# Patient Record
Sex: FEMALE | Race: WHITE | NOT HISPANIC OR LATINO | Employment: PART TIME | URBAN - METROPOLITAN AREA
[De-identification: names, ages, dates, MRNs, and addresses within clinical notes are randomized per-mention and may not be internally consistent; named-entity substitution may affect disease eponyms.]

---
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
## 2019-01-21 ENCOUNTER — TELEPHONE (OUTPATIENT)
Dept: FAMILY MEDICINE | Facility: CLINIC | Age: 19
End: 2019-01-21

## 2019-01-21 ENCOUNTER — OFFICE VISIT (OUTPATIENT)
Dept: FAMILY MEDICINE | Facility: CLINIC | Age: 19
End: 2019-01-21
Payer: COMMERCIAL

## 2019-01-21 VITALS
HEIGHT: 66 IN | RESPIRATION RATE: 16 BRPM | WEIGHT: 151.2 LBS | HEART RATE: 108 BPM | BODY MASS INDEX: 24.3 KG/M2 | SYSTOLIC BLOOD PRESSURE: 108 MMHG | DIASTOLIC BLOOD PRESSURE: 70 MMHG | TEMPERATURE: 99 F | OXYGEN SATURATION: 99 %

## 2019-01-21 DIAGNOSIS — K21.9 GASTROESOPHAGEAL REFLUX DISEASE, ESOPHAGITIS PRESENCE NOT SPECIFIED: ICD-10-CM

## 2019-01-21 DIAGNOSIS — N94.6 DYSMENORRHEA: ICD-10-CM

## 2019-01-21 DIAGNOSIS — Z00.00 ROUTINE GENERAL MEDICAL EXAMINATION AT A HEALTH CARE FACILITY: Primary | ICD-10-CM

## 2019-01-21 DIAGNOSIS — L70.0 ACNE VULGARIS: ICD-10-CM

## 2019-01-21 PROCEDURE — 90471 IMMUNIZATION ADMIN: CPT | Performed by: FAMILY MEDICINE

## 2019-01-21 PROCEDURE — 87591 N.GONORRHOEAE DNA AMP PROB: CPT | Performed by: FAMILY MEDICINE

## 2019-01-21 PROCEDURE — 90734 MENACWYD/MENACWYCRM VACC IM: CPT | Performed by: FAMILY MEDICINE

## 2019-01-21 PROCEDURE — 99395 PREV VISIT EST AGE 18-39: CPT | Mod: 25 | Performed by: FAMILY MEDICINE

## 2019-01-21 PROCEDURE — 87491 CHLMYD TRACH DNA AMP PROBE: CPT | Performed by: FAMILY MEDICINE

## 2019-01-21 RX ORDER — CLINDAMYCIN PHOSPHATE 10 UG/ML
LOTION TOPICAL 2 TIMES DAILY
Qty: 60 ML | Refills: 3 | Status: SHIPPED | OUTPATIENT
Start: 2019-01-21 | End: 2020-01-21

## 2019-01-21 RX ORDER — NORGESTIMATE AND ETHINYL ESTRADIOL 0.25-0.035
1 KIT ORAL DAILY
Qty: 84 TABLET | Refills: 3 | Status: SHIPPED | OUTPATIENT
Start: 2019-01-21 | End: 2023-01-09

## 2019-01-21 ASSESSMENT — ENCOUNTER SYMPTOMS
NAUSEA: 0
PALPITATIONS: 0
ARTHRALGIAS: 0
EYE PAIN: 0
FREQUENCY: 0
CHILLS: 0
DIARRHEA: 0
SHORTNESS OF BREATH: 0
NERVOUS/ANXIOUS: 0
JOINT SWELLING: 0
DIZZINESS: 0
BREAST MASS: 0
HEMATURIA: 0
DYSURIA: 0
HEARTBURN: 1
PARESTHESIAS: 0
HEMATOCHEZIA: 0
MYALGIAS: 0
SORE THROAT: 0
ABDOMINAL PAIN: 0
COUGH: 0
WEAKNESS: 0
CONSTIPATION: 0
HEADACHES: 1
FEVER: 0

## 2019-01-21 ASSESSMENT — MIFFLIN-ST. JEOR: SCORE: 1469.65

## 2019-01-21 NOTE — PROGRESS NOTES
"   SUBJECTIVE:   CC: Giselle Lopez is an 18 year old woman who presents for preventive health visit.     Physical   Annual:     Getting at least 3 servings of Calcium per day:  Yes    Bi-annual eye exam:  Yes    Dental care twice a year:  Yes    Sleep apnea or symptoms of sleep apnea:  None    Diet:  Regular (no restrictions)    Frequency of exercise:  1 day/week    Duration of exercise:  Less than 15 minutes    Taking medications regularly:  Yes    Medication side effects:  Not applicable    Additional concerns today:  Yes    PHQ-2 Total Score: 0    GERD/Heartburn  Onset: few months ago     Description:     Burning in chest: no    Intensity: severe, 6-7/10    Progression of Symptoms: worsening and intermittent    Accompanying Signs & Symptoms:  Does it feel like food gets stuck: no  Nausea: no  Vomiting (bloody?): no  Abdominal Pain: YES- sometimes   Black-Tarry stools: no:  Bloody stools: no    History:   Previous ulcers: no    Precipitating factors:   Caffeine use: no  Alcohol use: no  NSAID/Aspirin use: no  Tobacco use: no  Worse with no particular food or drink.    Alleviating factors:  Chewable OTC medication     Therapies Tried and outcome:chewable antacid-does seem to help     She has been having acid reflux recently and had norovirus last week. Since then many different foods have been triggering acid reflux. She has been trying to identify triggers for the heartburn but has not been successful. Notes that at one point pretzels were triggering symptoms so she refrained from eating this but now pretzels don't trigger symptoms. Denies excessive ibuprofen use. Notes that symptoms include an \"acid feeling going up her throat and it hurts\". This occurs when she is both sitting and laying down and may also occur randomly. She has tried taking OTC antacids which provides some relief. She had no hx of acid reflux prior to a few months ago. She has not had heartburn since she had her wisdom teeth removed 1/17. "     Acne  She stopped taking the OCP the beginning of the year because she is not sexually active but her acne worsened so she resumed taking it again but her acne has not cleared. She has been taking it for 3 months now. She has been applying cetaphil face wash and lotion and then uses foundation powder and mascara. Nothing is different. She has tried using acne treatment but it did not seem to work and it dried her skin. She does not change her pillow case once a week.     Weight    Wt Readings from Last 3 Encounters:   01/21/19 68.6 kg (151 lb 3.2 oz) (81 %)*   11/24/17 70.3 kg (155 lb) (86 %)*   11/04/16 59.7 kg (131 lb 9.6 oz) (65 %)*     * Growth percentiles are based on [facility] (Girls, 2-20 Years) data.     Patient has lost 4 lbs since LOV. Notes that she is not living on campus due to remodeling so she has been cooking at home and not eating dining sinclair food.     Other problems to add on...  -Noted that HR is elevated today, 108 bpm. She denies any recent decongestant use and she does not regularly drink caffeine or energy drinks.  Notes that she has been taking pain medications due to having her wisdom teeth removed 1.17. The procedure went well and she is healing well.       Today's PHQ-2 Score:   PHQ-2 ( 1999 Pfizer) 1/21/2019   Q1: Little interest or pleasure in doing things 0   Q2: Feeling down, depressed or hopeless 0   PHQ-2 Score 0   Q1: Little interest or pleasure in doing things Not at all   Q2: Feeling down, depressed or hopeless Not at all   PHQ-2 Score 0     Abuse: Current or Past(Physical, Sexual or Emotional)- NO  Do you feel safe in your environment? YES    Social History     Tobacco Use     Smoking status: Never Smoker     Smokeless tobacco: Never Used   Substance Use Topics     Alcohol use: No     Alcohol Use 1/21/2019   If you drink alcohol do you typically have greater than 3 drinks per day OR greater than 7 drinks per week? No   No flowsheet data found.    Reviewed orders with patient.   Reviewed health maintenance and updated orders accordingly - Yes  Labs reviewed in EPIC  BP Readings from Last 3 Encounters:   01/21/19 108/70 (26 %/ 67 %)*   11/24/17 119/79 (76 %/ 92 %)*   11/04/16 100/60 (11 %/ 22 %)*     *BP percentiles are based on the August 2017 AAP Clinical Practice Guideline for girls    Wt Readings from Last 3 Encounters:   01/21/19 68.6 kg (151 lb 3.2 oz) (81 %)*   11/24/17 70.3 kg (155 lb) (86 %)*   11/04/16 59.7 kg (131 lb 9.6 oz) (65 %)*     * Growth percentiles are based on CDC (Girls, 2-20 Years) data.          Recent Labs   Lab Test 08/11/11  0935   ALT 16   CR 0.61   GFRESTIMATED GFR not calculated, patient <16 years old.   GFRESTBLACK GFR not calculated, patient <16 years old.   POTASSIUM 4.2   TSH 2.79      Mammogram not appropriate for this patient based on age.    Pertinent mammograms are reviewed under the imaging tab.    History of abnormal Pap smear: NO - under age 21, PAP not appropriate for age     Reviewed and updated as needed this visit by clinical staff  Tobacco  Allergies  Meds  Problems  Med Hx  Surg Hx  Soc Hx        Reviewed and updated as needed this visit by Provider          Review of Systems   Constitutional: Negative for chills and fever.   HENT: Negative for congestion, ear pain, hearing loss and sore throat.    Eyes: Positive for visual disturbance. Negative for pain.   Respiratory: Negative for cough and shortness of breath.    Cardiovascular: Negative for chest pain, palpitations and peripheral edema.   Gastrointestinal: Positive for heartburn. Negative for abdominal pain, constipation, diarrhea, hematochezia and nausea.   Breasts:  Negative for tenderness, breast mass and discharge.   Genitourinary: Negative for dysuria, frequency, genital sores, hematuria, pelvic pain, urgency, vaginal bleeding and vaginal discharge.   Musculoskeletal: Negative for arthralgias, joint swelling and myalgias.   Skin: Negative for rash.   Neurological: Positive for  "headaches. Negative for dizziness, weakness and paresthesias.   Psychiatric/Behavioral: Negative for mood changes. The patient is not nervous/anxious.      This document serves as a record of the services and decisions personally performed and made by Cortney Tong MD. It was created on her behalf by Kylee Phan, a trained medical scribe. The creation of this document is based on the provider's statements to the medical scribe.  Kylee Phan January 21, 2019 9:52 AM      OBJECTIVE:   /70 (BP Location: Right arm, Patient Position: Sitting, Cuff Size: Adult Regular)   Pulse 108   Temp 99  F (37.2  C) (Oral)   Resp 16   Ht 1.664 m (5' 5.5\")   Wt 68.6 kg (151 lb 3.2 oz)   LMP 01/06/2019   SpO2 99%   BMI 24.78 kg/m       Physical Exam  GENERAL: healthy, alert and no distress  EYES: Eyes grossly normal to inspection, PERRL and conjunctivae and sclerae normal  HENT: ear canals and TM's normal, nose and mouth without ulcers or lesions  NECK: no adenopathy, no asymmetry, masses, or scars and thyroid normal to palpation  RESP: lungs clear to auscultation - no rales, rhonchi or wheezes  BREAST: normal without masses, tenderness or nipple discharge and no palpable axillary masses or adenopathy  CV: regular rate and rhythm, normal S1 S2, no S3 or S4, no murmur, click or rub, no peripheral edema and peripheral pulses strong  ABDOMEN: soft, nontender, no hepatosplenomegaly, no masses and bowel sounds normal  MS: no gross musculoskeletal defects noted, no edema  SKIN: no suspicious lesions or rashes  NEURO: Normal strength and tone, mentation intact and speech normal  PSYCH: mentation appears normal, affect normal/bright    Diagnostic Test Results:  No results found for this or any previous visit (from the past 24 hour(s)).    ASSESSMENT/PLAN:   (Z00.00) Routine general medical examination at a health care facility  (primary encounter diagnosis)  Comment: Normal physical exam.   Plan: NEISSERIA GONORRHOEA PCR, " "CHLAMYDIA TRACHOMATIS        PCR          (N94.6) Dysmenorrhea  Comment: Periods regular. Refilled rx  Plan: norgestimate-ethinyl estradiol         (ORTHO-CYCLEN/SPRINTEC) 0.25-35 MG-MCG tablet          (K21.9) Gastroesophageal reflux disease, esophagitis presence not specified  Comment: Screening for H. Pylori. Recommended starting Zantac 150 mg twice daily. Refrain from eating 2 hours before bed. Discussed that fatty, acidic, and spicy foods will trigger symptoms, avoid these.   Plan: ranitidine (ZANTAC) 150 MG tablet, H Pylori         antigen stool          (L70.0) Acne vulgaris  Comment: Recommended clindamycin and washing pillow case once a week.   Plan: clindamycin (CLEOCIN T) 1 % external lotion          Follow up in 1 year      COUNSELING:  Reviewed preventive health counseling, as reflected in patient instructions       Regular exercise       Healthy diet/nutrition    BP Readings from Last 1 Encounters:   01/21/19 108/70 (26 %/ 67 %)*     *BP percentiles are based on the August 2017 AAP Clinical Practice Guideline for girls     Estimated body mass index is 24.78 kg/m  as calculated from the following:    Height as of this encounter: 1.664 m (5' 5.5\").    Weight as of this encounter: 68.6 kg (151 lb 3.2 oz).   reports that  has never smoked. she has never used smokeless tobacco.    Counseling Resources:  ATP IV Guidelines  Pooled Cohorts Equation Calculator  Breast Cancer Risk Calculator  FRAX Risk Assessment  ICSI Preventive Guidelines  Dietary Guidelines for Americans, 2010  USDA's MyPlate  ASA Prophylaxis  Lung CA Screening    The information in this document, created by the medical scribe for me, accurately reflects the services I personally performed and the decisions made by me. I have reviewed and approved this document for accuracy prior to leaving the patient care area.  January 21, 2019 10:05 AM    Cortney Tong MD  [facility]  "

## 2019-01-21 NOTE — TELEPHONE ENCOUNTER
Disp Refills Start End FAROOQ   ranitidine (ZANTAC) 150 MG tablet 60 tablet 3 1/21/2019 1/21/2020 --   Sig - Route: Take 1 tablet (150 mg) by mouth 2 times daily - Oral   Sent to pharmacy as: ranitidine (ZANTAC) 150 MG tablet   Class: E-Prescribe     Order corrected to cover 90 day supply, no refills    Catherine Guido RN, BS  Clinical Nurse Triage.

## 2019-01-21 NOTE — NURSING NOTE
"Chief Complaint   Patient presents with     Physical     Refill Request     Heartburn     Derm Problem     Initial /70 (BP Location: Right arm, Patient Position: Sitting, Cuff Size: Adult Regular)   Pulse 108   Temp 99  F (37.2  C) (Oral)   Resp 16   Ht 1.664 m (5' 5.5\")   Wt 68.6 kg (151 lb 3.2 oz)   LMP 01/06/2019   SpO2 99%   BMI 24.78 kg/m   Estimated body mass index is 24.78 kg/m  as calculated from the following:    Height as of this encounter: 1.664 m (5' 5.5\").    Weight as of this encounter: 68.6 kg (151 lb 3.2 oz).  BP completed using cuff size regular RIGHT arm    Lisa Magill, CMA    "

## 2019-01-21 NOTE — NURSING NOTE
Screening Questionnaire for Adult Immunization    Are you sick today?   No   Do you have allergies to medications, food, a vaccine component or latex?   No   Have you ever had a serious reaction after receiving a vaccination?   No   Do you have a long-term health problem with heart disease, lung disease, asthma, kidney disease, metabolic disease (e.g. diabetes), anemia, or other blood disorder?   No   Do you have cancer, leukemia, HIV/AIDS, or any other immune system problem?   No   In the past 3 months, have you taken medications that affect  your immune system, such as prednisone, other steroids, or anticancer drugs; drugs for the treatment of rheumatoid arthritis, Crohn s disease, or psoriasis; or have you had radiation treatments?   No   Have you had a seizure, or a brain or other nervous system problem?   No   During the past year, have you received a transfusion of blood or blood     products, or been given immune (gamma) globulin or antiviral drug?   No   For women: Are you pregnant or is there a chance you could become        pregnant during the next month?   No   Have you received any vaccinations in the past 4 weeks?   No     Immunization questionnaire answers were all negative.        Per orders of Dr. Tong, injection of Menactra given by Patricia Richardson. Patient instructed to remain in clinic for 15 minutes afterwards, and to report any adverse reaction to me immediately.       Screening performed by Patricia Richardson on 1/21/2019 at 10:18 AM.

## 2019-01-22 DIAGNOSIS — K21.9 GASTROESOPHAGEAL REFLUX DISEASE, ESOPHAGITIS PRESENCE NOT SPECIFIED: ICD-10-CM

## 2019-01-22 PROCEDURE — 87338 HPYLORI STOOL AG IA: CPT | Performed by: FAMILY MEDICINE

## 2019-01-24 LAB
H PYLORI AG STL QL IA: NORMAL
SPECIMEN SOURCE: NORMAL

## 2019-11-07 ENCOUNTER — HEALTH MAINTENANCE LETTER (OUTPATIENT)
Age: 19
End: 2019-11-07

## 2020-11-29 ENCOUNTER — HEALTH MAINTENANCE LETTER (OUTPATIENT)
Age: 20
End: 2020-11-29

## 2021-09-25 ENCOUNTER — HEALTH MAINTENANCE LETTER (OUTPATIENT)
Age: 21
End: 2021-09-25

## 2022-01-15 ENCOUNTER — HEALTH MAINTENANCE LETTER (OUTPATIENT)
Age: 22
End: 2022-01-15

## 2022-07-13 ENCOUNTER — TRANSFERRED RECORDS (OUTPATIENT)
Dept: MULTI SPECIALTY CLINIC | Facility: CLINIC | Age: 22
End: 2022-07-13

## 2022-07-13 LAB — PAP-ABSTRACT: NORMAL

## 2022-12-26 ENCOUNTER — HEALTH MAINTENANCE LETTER (OUTPATIENT)
Age: 22
End: 2022-12-26

## 2023-01-09 ENCOUNTER — PRENATAL OFFICE VISIT (OUTPATIENT)
Dept: NURSING | Facility: CLINIC | Age: 23
End: 2023-01-09

## 2023-01-09 VITALS — BODY MASS INDEX: 25.16 KG/M2 | HEIGHT: 65 IN

## 2023-01-09 DIAGNOSIS — Z34.90 ENCOUNTER FOR SUPERVISION OF NORMAL PREGNANCY: Primary | ICD-10-CM

## 2023-01-09 DIAGNOSIS — Z23 NEED FOR TDAP VACCINATION: ICD-10-CM

## 2023-01-09 PROBLEM — G43.109 MIGRAINE WITH AURA: Status: ACTIVE | Noted: 2019-01-31

## 2023-01-09 PROBLEM — M54.31 SCIATICA OF RIGHT SIDE: Status: ACTIVE | Noted: 2021-01-21

## 2023-01-09 PROBLEM — H34.239 BRANCH RETINAL ARTERY OCCLUSION: Status: ACTIVE | Noted: 2019-01-31

## 2023-01-09 LAB
ABO/RH(D): NORMAL
ANTIBODY SCREEN: NEGATIVE
SPECIMEN EXPIRATION DATE: NORMAL

## 2023-01-09 PROCEDURE — 99207 PR NO CHARGE NURSE ONLY: CPT

## 2023-01-09 RX ORDER — SUMATRIPTAN 25 MG/1
25 TABLET, FILM COATED ORAL
Status: ON HOLD | COMMUNITY
Start: 2021-01-21 | End: 2023-08-04

## 2023-01-09 RX ORDER — OMEPRAZOLE 20 MG/1
20 TABLET, DELAYED RELEASE ORAL DAILY
Status: ON HOLD | COMMUNITY
End: 2023-08-04

## 2023-01-09 NOTE — PROGRESS NOTES
Important Information for Provider:     New ob nurse intake by phone, second pregnancy. Handouts reviewed. Recommended B6, Unisom for nausea . Patient is still breastfeeding, delivered 2022  in [place]. Discussed genetic screening. NOB 1/10/2023 with CNM. Ultrasound scheduled for 2023( NOB scheduled before nurse intake , no ultrasound ordered) CNM to call with results    Caffeine intake/servings daily - 1  Calcium intake/servings daily - 3  Exercise 5 times weekly - describe ; walks, precautions given  Sunscreen used - Yes  Seatbelts used - Yes  Guns stored in the home - No  Self Breast Exam - Yes  Pap test up to date -  Yes  Dental exam up to date -  Yes  Immunizations reviewed and up to date - Yes  Abuse: Current or Past (Physical, Sexual or Emotional) - No  Do you feel safe in your environment - Yes  Do you cope well with stress - Yes      Prenatal OB Questionnaire  Patient supplied answers from flow sheet for:  Prenatal OB Questionnaire.  Past Medical History  Have you ever recieved care for your mental health? : No  Have you ever been in a major accident or suffered serious trauma?: No  Within the last year, has anyone hit, slapped, kicked or otherwise hurt you?: No  In the last year, has anyone forced you to have sex when you didn't want to?: No    Past Medical History 2   Have you ever received a blood transfusion?: No  Would you accept a blood transfusion if was medically recommended?: Yes  Does anyone in your home smoke?: No   Is your blood type Rh negative?: Unknown  Have you ever ?: (!) Yes  Have you been hospitalized for a nonsurgical reason excluding normal delivery?: No  Have you ever had an abnormal pap smear?: No    Past Medical History (Continued)  Do you have a history of abnormalities of the uterus?: No  Did your mother take BUTCH or any other hormones when she was pregnant with you?: No  Do you have any other problems we have not asked about which you feel may be important  to this pregnancy?: No                     Allergies as of 1/9/2023:    Allergies as of 01/09/2023 - Reviewed 01/21/2019   Allergen Reaction Noted     Amoxicillin Hives 08/24/2005       Current medications are:  Current Outpatient Medications   Medication Sig Dispense Refill     Prenatal Vit-Ebony-Fe Fum-FA (VINATE M) 27-1 MG TABS Take 1 tablet by mouth daily       omeprazole (PRILOSEC OTC) 20 MG EC tablet Take 20 mg by mouth daily (Patient not taking: Reported on 1/9/2023)       SUMAtriptan (IMITREX) 25 MG tablet Take 25 mg by mouth (Patient not taking: Reported on 1/9/2023)           Early ultrasound screening tool:    Does patient have irregular periods?  No  Did patient use hormonal birth control in the three months prior to positive urine pregnancy test? No  Is the patient breastfeeding?  No  Is the patient 10 weeks or greater at time of education visit?  Yes

## 2023-01-10 ENCOUNTER — PRENATAL OFFICE VISIT (OUTPATIENT)
Dept: MIDWIFE SERVICES | Facility: CLINIC | Age: 23
End: 2023-01-10
Payer: COMMERCIAL

## 2023-01-10 VITALS
TEMPERATURE: 98 F | HEIGHT: 65 IN | HEART RATE: 94 BPM | WEIGHT: 149.3 LBS | DIASTOLIC BLOOD PRESSURE: 69 MMHG | BODY MASS INDEX: 24.87 KG/M2 | SYSTOLIC BLOOD PRESSURE: 112 MMHG

## 2023-01-10 DIAGNOSIS — Z34.80 SUPERVISION OF OTHER NORMAL PREGNANCY, ANTEPARTUM: ICD-10-CM

## 2023-01-10 LAB
ALBUMIN UR-MCNC: NEGATIVE MG/DL
APPEARANCE UR: CLEAR
BILIRUB UR QL STRIP: NEGATIVE
COLOR UR AUTO: YELLOW
ERYTHROCYTE [DISTWIDTH] IN BLOOD BY AUTOMATED COUNT: 14.3 % (ref 10–15)
GLUCOSE UR STRIP-MCNC: NEGATIVE MG/DL
HCT VFR BLD AUTO: 37.2 % (ref 35–47)
HGB BLD-MCNC: 12.5 G/DL (ref 11.7–15.7)
HGB UR QL STRIP: NEGATIVE
KETONES UR STRIP-MCNC: NEGATIVE MG/DL
LEUKOCYTE ESTERASE UR QL STRIP: NEGATIVE
MCH RBC QN AUTO: 26.8 PG (ref 26.5–33)
MCHC RBC AUTO-ENTMCNC: 33.6 G/DL (ref 31.5–36.5)
MCV RBC AUTO: 80 FL (ref 78–100)
NITRATE UR QL: NEGATIVE
PH UR STRIP: 6 [PH] (ref 5–7)
PLATELET # BLD AUTO: 257 10E3/UL (ref 150–450)
RBC # BLD AUTO: 4.67 10E6/UL (ref 3.8–5.2)
SP GR UR STRIP: 1.01 (ref 1–1.03)
UROBILINOGEN UR STRIP-ACNC: 0.2 E.U./DL
WBC # BLD AUTO: 7 10E3/UL (ref 4–11)

## 2023-01-10 PROCEDURE — 86780 TREPONEMA PALLIDUM: CPT | Performed by: ADVANCED PRACTICE MIDWIFE

## 2023-01-10 PROCEDURE — 85027 COMPLETE CBC AUTOMATED: CPT | Performed by: ADVANCED PRACTICE MIDWIFE

## 2023-01-10 PROCEDURE — 87389 HIV-1 AG W/HIV-1&-2 AB AG IA: CPT | Performed by: ADVANCED PRACTICE MIDWIFE

## 2023-01-10 PROCEDURE — 87086 URINE CULTURE/COLONY COUNT: CPT | Performed by: ADVANCED PRACTICE MIDWIFE

## 2023-01-10 PROCEDURE — 81003 URINALYSIS AUTO W/O SCOPE: CPT | Performed by: ADVANCED PRACTICE MIDWIFE

## 2023-01-10 PROCEDURE — 86901 BLOOD TYPING SEROLOGIC RH(D): CPT | Performed by: ADVANCED PRACTICE MIDWIFE

## 2023-01-10 PROCEDURE — 86900 BLOOD TYPING SEROLOGIC ABO: CPT | Performed by: ADVANCED PRACTICE MIDWIFE

## 2023-01-10 PROCEDURE — 86803 HEPATITIS C AB TEST: CPT | Performed by: ADVANCED PRACTICE MIDWIFE

## 2023-01-10 PROCEDURE — 86850 RBC ANTIBODY SCREEN: CPT | Performed by: ADVANCED PRACTICE MIDWIFE

## 2023-01-10 PROCEDURE — 36415 COLL VENOUS BLD VENIPUNCTURE: CPT | Performed by: ADVANCED PRACTICE MIDWIFE

## 2023-01-10 PROCEDURE — 99207 PR FIRST OB VISIT: CPT | Performed by: ADVANCED PRACTICE MIDWIFE

## 2023-01-10 PROCEDURE — 87340 HEPATITIS B SURFACE AG IA: CPT | Performed by: ADVANCED PRACTICE MIDWIFE

## 2023-01-10 PROCEDURE — 86762 RUBELLA ANTIBODY: CPT | Performed by: ADVANCED PRACTICE MIDWIFE

## 2023-01-10 ASSESSMENT — PATIENT HEALTH QUESTIONNAIRE - PHQ9: SUM OF ALL RESPONSES TO PHQ QUESTIONS 1-9: 0

## 2023-01-10 NOTE — PATIENT INSTRUCTIONS
Deajabari Desai     Congratulations on your pregnancy!! We are so pleased you have chosen us to partner with you for your prenatal care, thank you.    You are welcome to make your appointments with any one of us.  If you would like to try and meet us all, please see the list.  If it works out better for your schedule, or you prefer, you could make your appointments with just a few of us.  You can also see us at the [facility] on Wednesdays.    Here is a list of the Certified Nurse Midwives in our group;   - Jameel Gray,   - Cb Barrow,   - Deanne Goodwin,   - Becki Linda,   - Chela Tatum,   - Zulma Horne,  - Laura Goldsmith.  We work as a team and share call at the hospital so it may be any one of us seven with you when you labor and birth.    If you have questions or concerns during your pregnancy please call us at [phone] and press option 1.  This routes you directly to our OB/Gyn clinic staff and nurses during business hours.  After hours your call will be routed to a central answering service and nurse line.    [email or web address] messages are great for communicating about non urgent matters.  If you are in labor, please call.  We would much rather connect with you then have you be at home and worried.    We wish you the very best, and we are excited to be part of your journey.    HOA Combs [facility]       Remedies for nausea and vomiting with pregnancy:    1. Eat small frequent meals every 2-3 hours if possible.     2. Avoid food at extremes of temparture and drinks without carbonation.    3. Eat foods that appeal to you, avoiding fats and spicy foods.    4. Bread, pasta, crackers, potatoes, and rice tend to be tolerated the best.    5. Don't worry as much about what you eat in the first 3 months, it is more important that you can eat and keep it down.     6. Try ginger ale.    7. Ginger is a herbal remedy for nausea and you can use it in any form.  There are ginger tablets you can  purchase.  The dose is 250 mg 4 times a day.     8. You may also try Unisom 25 mg at night which is a sleeping medication along with Vitamin B6 25- 50 mg morning and night.  This combination takes up to a week to work so give it some time.     9. Go for walk, get some fresh air    If you begin to vomit more than 5 or 6 times a day and feel that you are unable to keep anything down, call the clinic for an appointment to be seen.  ([phone])    I hope you feel better.    HOA Combs CNM

## 2023-01-10 NOTE — PROGRESS NOTES
12w6d   NOB intake, had last baby in [place], no concerns.  Delivered 2022.  Pt is still breastfeeding and was having irregular menstrual cycles.  LMP of 10/12/22, within 2-3 days of either side of that date.  Declines genetic testing.  Has dating U/S scheduled on 23 with CNM to review.  RTC 4 wks HOA Combs CNM     Giselle Edwards is a 22 year old female    Pt presents to clinic today for a NOB visit.  Patient's last menstrual period was 10/12/2022.. Estimated Date of Delivery: 2023 is calculated from lmp alone, waiting on U/S for confirmation     She has not had bleeding since her LMP.   She has had mild nausea. Weight loss has not occurred.   This was a planned pregnancy.   FOB is involved,   [place]      OTHER CONCERNS: Pt states none  1. Pt has a report of hx of migraine with visual symptoms.  In 2019 had a blurry spot in her vision that has continued until today.  Has improved somewhat.  Last migraine 2019, none with last pregnancy.  Reports that there were no changes to management during her previous pregnancy.      Pt's hx of HSV is negative    ============================================  PERSONAL/SOCIAL HISTORY  Lives lives with their family.  Employment: Part time.  Her job involves moderate activity . ( couple hours several mornings a week)  Exercises minimal exercise  Pt denies abuse and feels safe in her home and relationships.     REVIEW OF SYSTEMS  C: NEGATIVE for fever, chills, change in weight  I: NEGATIVE for worrisome rashes, moles or lesions  E/M: NEGATIVE for ear, mouth and throat problems  R: NEGATIVE for significant cough or SOB  CV: NEGATIVE for chest pain, palpitations or peripheral edema  GI: NEGATIVE for nausea, abdominal pain, heartburn, or change in bowel habits  : NEGATIVE for frequency, dysuria, or hematuria  PSYCHIATRIC:  NEGATIVE for depression, anxiety or other mental health concerns    PHYSICAL EXAM:  /69   Pulse 94   " Temp 98  F (36.7  C) (Oral)   Ht 1.651 m (5' 5\")   Wt 67.7 kg (149 lb 4.8 oz)   LMP 10/12/2022   BMI 24.84 kg/m    BMI- Body mass index is 24.84 kg/m ., RECOMMENDED WEIGHT GAIN: 25-35 lbs.  GENERAL:  Pleasant pregnant female, alert, cooperative and well groomed.  SKIN:  Warm and dry, without lesions or rashes  HEAD: Symmetrical features.  MOUTH:  Buccal mucosa pink, moist without lesions.  Teeth in good repair.    NECK:  Thyroid without enlargement and nodules.  Lymph nodes not palpable.   LUNGS:  Clear to auscultation.      HEART:  RRR without murmur.  ABDOMEN: Soft without masses , tenderness or organomegaly.  No CVA tenderness.  Uterus not palpable at size equal to dates.  No scars noted..  MUSCULOSKELETAL:  Full range of motion  EXTREMITIES:  No edema. No significant varicosities.     PELVIC EXAM:  Deferred        GC/CHLAMYDIA CULTURE OBTAINED:PATIENT DECLINED    ASSESSMENT:  Intrauterine pregnancy at 12w6d weeks gestation.     (Z34.90) Encounter for supervision of normal pregnancy  Comment:   Plan:      PLAN:  Oriented to CNM service.    Instructed on use of triage nurse line and contacting the on call CNM after hours for an urgent need such as fever, vagina bleeding, bladder or vaginal infection, rupture of membranes,  or term labor.      Discussed the indications, uses for and false positives for quad screen  and fetal survey ultrasound at 18-20 weeks gestation. Will inform us at the next visit if she wished to avail herself of these screens.    Instructed on best evidence for: weight gain for her weight and height for pregnancy; healthy diet; exercise and activity during pregnancy; and maintenance of a generally healthy lifestyle.     Discussed the harms, benefits, side effects and alternative therapies for current prescribed and OTC medications.    Laura CHAVES    "

## 2023-01-11 LAB
HBV SURFACE AG SERPL QL IA: NONREACTIVE
HCV AB SERPL QL IA: NONREACTIVE
HIV 1+2 AB+HIV1 P24 AG SERPL QL IA: NONREACTIVE
RUBV IGG SERPL QL IA: 6.77 INDEX
RUBV IGG SERPL QL IA: POSITIVE
T PALLIDUM AB SER QL: NONREACTIVE

## 2023-01-12 ENCOUNTER — VIRTUAL VISIT (OUTPATIENT)
Dept: MIDWIFE SERVICES | Facility: CLINIC | Age: 23
End: 2023-01-12
Payer: COMMERCIAL

## 2023-01-12 ENCOUNTER — ANCILLARY PROCEDURE (OUTPATIENT)
Dept: ULTRASOUND IMAGING | Facility: CLINIC | Age: 23
End: 2023-01-12
Attending: ADVANCED PRACTICE MIDWIFE
Payer: COMMERCIAL

## 2023-01-12 DIAGNOSIS — Z34.81 ENCOUNTER FOR SUPERVISION OF OTHER NORMAL PREGNANCY, FIRST TRIMESTER: Primary | ICD-10-CM

## 2023-01-12 DIAGNOSIS — Z34.90 ENCOUNTER FOR SUPERVISION OF NORMAL PREGNANCY: ICD-10-CM

## 2023-01-12 LAB — BACTERIA UR CULT: NORMAL

## 2023-01-12 PROCEDURE — 76801 OB US < 14 WKS SINGLE FETUS: CPT | Mod: TC | Performed by: RADIOLOGY

## 2023-01-12 PROCEDURE — 99207 PR NO BILLABLE SERVICE THIS VISIT: CPT | Performed by: ADVANCED PRACTICE MIDWIFE

## 2023-01-12 NOTE — PROGRESS NOTES
Telephone-Visit Details    Type of service:  telephone Visit    Telephone Start Time ): 1543    Telephone End Time : 1550    Originating Location (pt. Location): Home    Distant Location (provider location):  On-site    Mode of Communication:  Telephone visit      S: Telephone call to patient to review ultrasound results from today. Feels well and reports no other symptoms other than fatigue.     O: ultrasound results reviewed were preliminary and showed a viable IUP at 10w6d with an EDC of 8/4/23. THis was discordant with her dates by LPM but she has closely spaced pregnancies and has been having 45-60 day cycles since she started menstruating again. She is not surprised by this adjusment to her due date.     A    (Z34.81) Encounter for supervision of other normal pregnancy, first trimester  (primary encounter diagnosis)  Comment: reviewed early ultrasound results today and adjusted due date. Otherwise no questions or concerns  Plan: Has next prenatal visit scheduled on 2/8 at [facility].    Deanne Goodwin, CHASTITY, APRJONES HAYWOOD

## 2023-02-08 ENCOUNTER — PRENATAL OFFICE VISIT (OUTPATIENT)
Dept: MIDWIFE SERVICES | Facility: CLINIC | Age: 23
End: 2023-02-08
Payer: COMMERCIAL

## 2023-02-08 VITALS
HEART RATE: 91 BPM | DIASTOLIC BLOOD PRESSURE: 65 MMHG | SYSTOLIC BLOOD PRESSURE: 109 MMHG | WEIGHT: 149 LBS | TEMPERATURE: 98.5 F | BODY MASS INDEX: 24.79 KG/M2

## 2023-02-08 DIAGNOSIS — Z34.82 ENCOUNTER FOR SUPERVISION OF OTHER NORMAL PREGNANCY IN SECOND TRIMESTER: Primary | ICD-10-CM

## 2023-02-08 PROCEDURE — 99207 PR PRENATAL VISIT: CPT | Performed by: ADVANCED PRACTICE MIDWIFE

## 2023-02-08 NOTE — PROGRESS NOTES
14w5d   Patient is feeling well. Denies any vaginal bleeding, water leaking, abdominal pain, or other concerns. No fetal movements yet.   Declined genetic testing. No questions or concerns.   Danger signs discussed. Patient knows when to call triage and has numbers to call.   Follow up in 5 weeks for fetal survey.   Zulma Horne CNM

## 2023-03-10 ENCOUNTER — ANCILLARY PROCEDURE (OUTPATIENT)
Dept: ULTRASOUND IMAGING | Facility: CLINIC | Age: 23
End: 2023-03-10
Attending: ADVANCED PRACTICE MIDWIFE
Payer: COMMERCIAL

## 2023-03-10 ENCOUNTER — PRENATAL OFFICE VISIT (OUTPATIENT)
Dept: MIDWIFE SERVICES | Facility: CLINIC | Age: 23
End: 2023-03-10
Attending: ADVANCED PRACTICE MIDWIFE
Payer: COMMERCIAL

## 2023-03-10 VITALS
BODY MASS INDEX: 25.63 KG/M2 | DIASTOLIC BLOOD PRESSURE: 62 MMHG | SYSTOLIC BLOOD PRESSURE: 100 MMHG | HEART RATE: 82 BPM | OXYGEN SATURATION: 99 % | WEIGHT: 154 LBS

## 2023-03-10 DIAGNOSIS — Z34.82 ENCOUNTER FOR SUPERVISION OF OTHER NORMAL PREGNANCY IN SECOND TRIMESTER: Primary | ICD-10-CM

## 2023-03-10 DIAGNOSIS — Z34.82 ENCOUNTER FOR SUPERVISION OF OTHER NORMAL PREGNANCY IN SECOND TRIMESTER: ICD-10-CM

## 2023-03-10 PROCEDURE — 99207 PR PRENATAL VISIT: CPT | Performed by: ADVANCED PRACTICE MIDWIFE

## 2023-03-10 PROCEDURE — 76805 OB US >/= 14 WKS SNGL FETUS: CPT | Performed by: OBSTETRICS & GYNECOLOGY

## 2023-03-10 NOTE — PROGRESS NOTES
19w0d  Patient is feeling well. Denies any vaginal bleeding, water leaking, abdominal pain, or other concerns. Starting to feel baby moving.   Here for follow up anatomy scan. Unable to see posterior fossa well due to fetal position. Repeat ultrasound ordered. Otherwise WNL. No genetic testing. Doing well. No concerns.   Danger signs discussed. Patient knows when to call triage and has numbers to call.   Follow up after repeat anatomy scan.    Zulma Horne CNM

## 2023-03-24 ENCOUNTER — PRENATAL OFFICE VISIT (OUTPATIENT)
Dept: MIDWIFE SERVICES | Facility: CLINIC | Age: 23
End: 2023-03-24
Attending: ADVANCED PRACTICE MIDWIFE
Payer: COMMERCIAL

## 2023-03-24 ENCOUNTER — ANCILLARY PROCEDURE (OUTPATIENT)
Dept: ULTRASOUND IMAGING | Facility: CLINIC | Age: 23
End: 2023-03-24
Attending: ADVANCED PRACTICE MIDWIFE
Payer: COMMERCIAL

## 2023-03-24 VITALS
DIASTOLIC BLOOD PRESSURE: 65 MMHG | SYSTOLIC BLOOD PRESSURE: 99 MMHG | TEMPERATURE: 98.7 F | BODY MASS INDEX: 26.38 KG/M2 | WEIGHT: 158.5 LBS | HEART RATE: 87 BPM

## 2023-03-24 DIAGNOSIS — Z34.82 ENCOUNTER FOR SUPERVISION OF OTHER NORMAL PREGNANCY IN SECOND TRIMESTER: ICD-10-CM

## 2023-03-24 DIAGNOSIS — Z34.82 ENCOUNTER FOR SUPERVISION OF OTHER NORMAL PREGNANCY IN SECOND TRIMESTER: Primary | ICD-10-CM

## 2023-03-24 PROCEDURE — 76816 OB US FOLLOW-UP PER FETUS: CPT | Performed by: OBSTETRICS & GYNECOLOGY

## 2023-03-24 PROCEDURE — 99207 PR PRENATAL VISIT: CPT | Performed by: ADVANCED PRACTICE MIDWIFE

## 2023-03-24 NOTE — PROGRESS NOTES
21w0d  Giselle is here after follow up anatomy ultrasound. All views visualized today. Reports good fetal movement. Denies leaking of fluid, vaginal bleeding, regular uterine contractions, headache, visual changes, or other concerns. Normal questions about prenatal care, hospital labor and delivery unit, etc. Discussed upcoming GCT in 3 weeks. Discussed COVID-19 booster. She declines today but after reviewing risks of COVID in pregnancy she is interested in getting booster at future appointment. RTC in 3 weeks for GCT, Hgb.    HOA Crawley CNM

## 2023-04-13 ENCOUNTER — TELEPHONE (OUTPATIENT)
Dept: NURSING | Facility: CLINIC | Age: 23
End: 2023-04-13
Payer: COMMERCIAL

## 2023-04-13 NOTE — TELEPHONE ENCOUNTER
Pt called stating that she has had some pain (pinching pulling) on her right side with nausea and lightheadedness.  Pt denies LOF of bleeding and confirms that baby is moving.  Pt advised to lay on left side and to drink 2 or more cups of water and reassess in 1 hour.  Pt advised to call if any bleeding. LOF or pain increases or is not relieved.  Pt verbalized understanding and agreed to the plan.

## 2023-04-16 ENCOUNTER — HEALTH MAINTENANCE LETTER (OUTPATIENT)
Age: 23
End: 2023-04-16

## 2023-04-21 ENCOUNTER — PRENATAL OFFICE VISIT (OUTPATIENT)
Dept: MIDWIFE SERVICES | Facility: CLINIC | Age: 23
End: 2023-04-21
Payer: COMMERCIAL

## 2023-04-21 VITALS
DIASTOLIC BLOOD PRESSURE: 64 MMHG | WEIGHT: 158 LBS | HEART RATE: 86 BPM | OXYGEN SATURATION: 100 % | BODY MASS INDEX: 26.29 KG/M2 | SYSTOLIC BLOOD PRESSURE: 105 MMHG

## 2023-04-21 DIAGNOSIS — Z34.82 ENCOUNTER FOR SUPERVISION OF OTHER NORMAL PREGNANCY IN SECOND TRIMESTER: Primary | ICD-10-CM

## 2023-04-21 PROCEDURE — 99207 PR PRENATAL VISIT: CPT | Performed by: ADVANCED PRACTICE MIDWIFE

## 2023-04-21 NOTE — PROGRESS NOTES
25w0d  Patient is feeling well. Positive fetal movement. Denies water leaking, vaginal bleeding, decreased fetal movement, contraction pain, or headaches.   Doing well and feeling good. Just getting over a stomach bug this past week. Was able to eat and stay hydrated. Went to parents for help caring for herself and daughter since her  was out of town. Their catalytic converter was stolen, they are down to 1 car until June. Not ready for GCT today. Will plan to do lab only visit in the next week or two. Having sciatic pain, down left leg. Had this previously so has PT exercises from before. Discussed maternity belt around 32 weeks as well. Now it is not too bothersome. No other questions or concerns today.   Danger signs reviewed, pre-eclampsia signs and symptoms discussed.   Knows when to call triage and has phone numbers.    Follow up in 4 weeks. Tdap vaccination next visit.   HOA Hall CNM

## 2023-05-08 ENCOUNTER — LAB (OUTPATIENT)
Dept: LAB | Facility: CLINIC | Age: 23
End: 2023-05-08
Payer: COMMERCIAL

## 2023-05-08 DIAGNOSIS — Z34.82 ENCOUNTER FOR SUPERVISION OF OTHER NORMAL PREGNANCY IN SECOND TRIMESTER: ICD-10-CM

## 2023-05-08 LAB
GLUCOSE 1H P 50 G GLC PO SERPL-MCNC: 110 MG/DL (ref 70–129)
HGB BLD-MCNC: 11.2 G/DL (ref 11.7–15.7)

## 2023-05-08 PROCEDURE — 36415 COLL VENOUS BLD VENIPUNCTURE: CPT

## 2023-05-08 PROCEDURE — 82950 GLUCOSE TEST: CPT

## 2023-05-17 ENCOUNTER — PRENATAL OFFICE VISIT (OUTPATIENT)
Dept: MIDWIFE SERVICES | Facility: CLINIC | Age: 23
End: 2023-05-17
Payer: COMMERCIAL

## 2023-05-17 VITALS
TEMPERATURE: 98.2 F | DIASTOLIC BLOOD PRESSURE: 70 MMHG | SYSTOLIC BLOOD PRESSURE: 120 MMHG | BODY MASS INDEX: 27.62 KG/M2 | WEIGHT: 166 LBS | HEART RATE: 109 BPM

## 2023-05-17 DIAGNOSIS — Z34.83 ENCOUNTER FOR SUPERVISION OF OTHER NORMAL PREGNANCY, THIRD TRIMESTER: Primary | ICD-10-CM

## 2023-05-17 PROCEDURE — 99207 PR PRENATAL VISIT: CPT | Performed by: ADVANCED PRACTICE MIDWIFE

## 2023-05-17 NOTE — PROGRESS NOTES
28w5d  Here at [facility]. Feeling well, no concerns. Baby is active. No regular contractions, LOF or bleeding. Works as a  and has to work today so would like to wait on TDAP and COVID booster. Will consider doing both at next visit. Discussed allergy testing, thinks she may have had PCN in the past without a reaction. Will check with her mom and let us know at next appointment. Has phone numbers. RTC in 4 weeks. MML

## 2023-06-14 ENCOUNTER — PRENATAL OFFICE VISIT (OUTPATIENT)
Dept: MIDWIFE SERVICES | Facility: CLINIC | Age: 23
End: 2023-06-14
Payer: COMMERCIAL

## 2023-06-14 VITALS
TEMPERATURE: 98 F | BODY MASS INDEX: 29.12 KG/M2 | SYSTOLIC BLOOD PRESSURE: 127 MMHG | WEIGHT: 175 LBS | HEART RATE: 92 BPM | DIASTOLIC BLOOD PRESSURE: 70 MMHG

## 2023-06-14 DIAGNOSIS — Z23 NEED FOR TDAP VACCINATION: ICD-10-CM

## 2023-06-14 DIAGNOSIS — Z34.83 ENCOUNTER FOR SUPERVISION OF OTHER NORMAL PREGNANCY IN THIRD TRIMESTER: Primary | ICD-10-CM

## 2023-06-14 DIAGNOSIS — Z88.0 ALLERGY TO AMOXICILLIN: ICD-10-CM

## 2023-06-14 PROCEDURE — 90471 IMMUNIZATION ADMIN: CPT | Performed by: ADVANCED PRACTICE MIDWIFE

## 2023-06-14 PROCEDURE — 99207 PR PRENATAL VISIT: CPT | Performed by: ADVANCED PRACTICE MIDWIFE

## 2023-06-14 PROCEDURE — 90715 TDAP VACCINE 7 YRS/> IM: CPT | Performed by: ADVANCED PRACTICE MIDWIFE

## 2023-06-14 NOTE — PROGRESS NOTES
32w5d  Patient is feeling well. Positive fetal movement. Denies water leaking, vaginal bleeding, decreased fetal movement, contraction pain, or headaches.   Doing well overall. Feeling more pressure from the baby sitting lower. Discussed a maternity support belt, she thinks she has one at home so prefers to try that before getting one from us. Has bad insurance, waiting to get on medical assistance, should be soon. She is currently on her dad's insurance. Will not be using his for delivery so not going to meet his deductible. She will let us know next visit if she cannot find one at home or it does not fit well. She also is craving ice, knows that sometimes means low iron. Her hemoglobin was recently checked at [facility] and was over 11, she cannot remember the number. Discussed sometimes iron can be low even with normal hemoglobin so can check that. She could also start eating more iron rich foods or supplement with iron tablets. Discussed next HGB check is at 36 weeks. We could check today as well if she wants and add in iron testing. She prefers to wait at this time. Will eat iron rich foods and consider testing iron with her HBG check at 36 weeks. She has an allergy to PCN, referral sent for allergy testing. May consider postponing until her medical insurance kicks in. Tdap and covid booster today. No other questions or concerns today.   Danger signs reviewed, pre-eclampsia signs and symptoms discussed.   Knows when to call triage and has phone numbers.   Follow up in 2 weeks.   Zulma Horne, HOA HAYWOOD

## 2023-06-26 ENCOUNTER — PRENATAL OFFICE VISIT (OUTPATIENT)
Dept: MIDWIFE SERVICES | Facility: CLINIC | Age: 23
End: 2023-06-26
Payer: COMMERCIAL

## 2023-06-26 VITALS
HEART RATE: 90 BPM | DIASTOLIC BLOOD PRESSURE: 66 MMHG | WEIGHT: 177.4 LBS | SYSTOLIC BLOOD PRESSURE: 97 MMHG | TEMPERATURE: 98 F | BODY MASS INDEX: 29.52 KG/M2

## 2023-06-26 DIAGNOSIS — Z34.83 ENCOUNTER FOR SUPERVISION OF OTHER NORMAL PREGNANCY IN THIRD TRIMESTER: Primary | ICD-10-CM

## 2023-06-26 PROCEDURE — 99207 PR PRENATAL VISIT: CPT | Performed by: ADVANCED PRACTICE MIDWIFE

## 2023-06-26 NOTE — PROGRESS NOTES
34w3d  Patient is feeling well. Positive fetal movement. Denies water leaking, vaginal bleeding, decreased fetal movement, contraction pain, or headaches.   Doing well. Her and her family are going to [place] for a week, very relaxing vacation with no agenda, leaving today. She has been feeling the same discomforts of pregnancy. Did not find her maternity belt, has not had a chance to look. Will let us know if she wants one from us. She has not gotten her new insurance yet so does not want it from us until that goes through. Also waiting on the insurance for an allergy appointment. No concerns today.   Danger signs reviewed, pre-eclampsia signs and symptoms discussed.   Knows when to call triage and has phone numbers.   Follow up in 2 weeks. GBS, HGB, and BSUS next visit.   HOA Hall CNM

## 2023-07-02 ENCOUNTER — NURSE TRIAGE (OUTPATIENT)
Dept: NURSING | Facility: CLINIC | Age: 23
End: 2023-07-02
Payer: COMMERCIAL

## 2023-07-03 NOTE — TELEPHONE ENCOUNTER
"[facility]. Pregnant 35w2d  Vomiting all day= 6-7 times. It began @ 8am. Whole family was vomiting last night and today. She is unable to keep even sips of water or Gatorade down. She last urinated 1/2 hr ago. Still feeling the baby move, no change noted with pregnancy..   OBGYN: Midwives     Triaged to a disposition of Go to ED now or PCP triage.  Chela Tatum CNM midwife on call, paged via am com @ 7:47pm. Patient should go to ED now for evaluation and possible IV fluids, etc.   7:52pm contacted patient with this information and she agrees with this plan.     Ifeoma Ernst RN Triage Nurse Advisor 7:52 PM 7/2/2023  Reason for Disposition    [1] SEVERE vomiting (e.g., 6 or more times/day) AND [2] present > 8 hours (Exception: patient sounds well, is drinking liquids, does not sound dehydrated, and vomiting has lasted less than 24 hours)    Additional Information    Negative: Shock suspected (e.g., cold/pale/clammy skin, too weak to stand, low BP, rapid pulse)    Negative: Difficult to awaken or acting confused (e.g., disoriented, slurred speech)    Negative: Sounds like a life-threatening emergency to the triager    Negative: Vomiting occurs only while coughing    Negative: [1] Pregnant < 20 Weeks AND [2] nausea/vomiting began in early pregnancy (i.e., 4-8 weeks pregnant)    Negative: Chest pain    Negative: Headache is main symptom    Negative: Vomiting (or Nausea) in a cancer patient who is currently (or recently) receiving chemotherapy or radiation therapy, or cancer patient who has metastatic or end-stage cancer and is receiving palliative care    Negative: [1] Vomiting AND [2] contains red blood or black (\"coffee ground\") material  (Exception: few red streaks in vomit that only happened once)    Negative: Severe pain in one eye    Negative: Recent head injury (within last 3 days)    Negative: Recent abdominal injury (within last 3 days)    Negative: [1] Insulin-dependent diabetes " (Type I) AND [2] glucose > 400 mg/dl (22 mmol/l)    Negative: [1] Vomiting AND [2] hernia is more painful or swollen than usual    Protocols used: VOMITING-A-AH

## 2023-07-12 ENCOUNTER — PRENATAL OFFICE VISIT (OUTPATIENT)
Dept: MIDWIFE SERVICES | Facility: CLINIC | Age: 23
End: 2023-07-12
Payer: COMMERCIAL

## 2023-07-12 VITALS
BODY MASS INDEX: 30.02 KG/M2 | SYSTOLIC BLOOD PRESSURE: 109 MMHG | WEIGHT: 180.4 LBS | HEART RATE: 80 BPM | DIASTOLIC BLOOD PRESSURE: 76 MMHG | OXYGEN SATURATION: 100 %

## 2023-07-12 DIAGNOSIS — Z34.83 ENCOUNTER FOR SUPERVISION OF OTHER NORMAL PREGNANCY IN THIRD TRIMESTER: Primary | ICD-10-CM

## 2023-07-12 LAB
HGB BLD-MCNC: 11.2 G/DL (ref 11.7–15.7)
HOLD SPECIMEN: NORMAL

## 2023-07-12 PROCEDURE — 87186 SC STD MICRODIL/AGAR DIL: CPT | Performed by: ADVANCED PRACTICE MIDWIFE

## 2023-07-12 PROCEDURE — 99207 PR PRENATAL VISIT: CPT | Performed by: ADVANCED PRACTICE MIDWIFE

## 2023-07-12 PROCEDURE — 36415 COLL VENOUS BLD VENIPUNCTURE: CPT | Performed by: ADVANCED PRACTICE MIDWIFE

## 2023-07-12 PROCEDURE — 87653 STREP B DNA AMP PROBE: CPT | Performed by: ADVANCED PRACTICE MIDWIFE

## 2023-07-12 PROCEDURE — 87077 CULTURE AEROBIC IDENTIFY: CPT | Performed by: ADVANCED PRACTICE MIDWIFE

## 2023-07-12 NOTE — PROGRESS NOTES
36w5d  Patient is feeling well. Positive fetal movement. Denies water leaking, vaginal bleeding, decreased fetal movement, contraction pain, or headaches.   Doing well. Had a nice vacation. Her daughter got sick and was vomiting all night the last night of their vacation and then her and her  got it. She had to go to the ER for IV fluids. BSUS shows cephalic position. GBS and HGB today.   Danger signs reviewed, pre-eclampsia signs and symptoms discussed.   Knows when to call triage and has phone numbers.   Follow up in 1 week.  HOA Hall CNM

## 2023-07-13 LAB — GP B STREP DNA SPEC QL NAA+PROBE: POSITIVE

## 2023-07-16 LAB — BACTERIA SPEC CULT: ABNORMAL

## 2023-07-18 ENCOUNTER — PRENATAL OFFICE VISIT (OUTPATIENT)
Dept: MIDWIFE SERVICES | Facility: CLINIC | Age: 23
End: 2023-07-18
Payer: COMMERCIAL

## 2023-07-18 VITALS
HEART RATE: 83 BPM | DIASTOLIC BLOOD PRESSURE: 65 MMHG | BODY MASS INDEX: 30.54 KG/M2 | SYSTOLIC BLOOD PRESSURE: 108 MMHG | WEIGHT: 183.5 LBS | OXYGEN SATURATION: 98 %

## 2023-07-18 DIAGNOSIS — Z34.83 ENCOUNTER FOR SUPERVISION OF OTHER NORMAL PREGNANCY IN THIRD TRIMESTER: Primary | ICD-10-CM

## 2023-07-18 PROCEDURE — 99207 PR PRENATAL VISIT: CPT | Performed by: ADVANCED PRACTICE MIDWIFE

## 2023-07-18 NOTE — PROGRESS NOTES
37w4d  Giselle is feeling well. Feeling some low back pain and Colstrip Mercado contractions and is just exhausted in general. Denies painful contractions. Encouraged rest and hydration. Reviewed s/sx of labor and when to call. Reports good fetal movement. Denies leaking of fluid, vaginal bleeding, regular uterine contractions, headache, visual changes, or other concerns. She has not done allergy testing in pregnancy and has a childhood rash reaction to amoxicillin. Plan treatment of GBS bacteria with vancomycin in labor. Discussed this together and plan for 48h observation of  by pediatric provider in hospital. RTC weekly.    HOA Crawley CNM

## 2023-07-26 ENCOUNTER — PRENATAL OFFICE VISIT (OUTPATIENT)
Dept: MIDWIFE SERVICES | Facility: CLINIC | Age: 23
End: 2023-07-26
Payer: COMMERCIAL

## 2023-07-26 VITALS
WEIGHT: 185 LBS | DIASTOLIC BLOOD PRESSURE: 60 MMHG | BODY MASS INDEX: 30.79 KG/M2 | HEART RATE: 90 BPM | SYSTOLIC BLOOD PRESSURE: 110 MMHG | TEMPERATURE: 97.1 F

## 2023-07-26 DIAGNOSIS — Z34.83 ENCOUNTER FOR SUPERVISION OF OTHER NORMAL PREGNANCY IN THIRD TRIMESTER: Primary | ICD-10-CM

## 2023-07-26 PROCEDURE — 99207 PR PRENATAL VISIT: CPT | Performed by: ADVANCED PRACTICE MIDWIFE

## 2023-07-26 NOTE — PROGRESS NOTES
38w5d   Patient is feeling well. Positive fetal movement. Denies water leaking, vaginal bleeding, decreased fetal movement, contraction pain, or headaches.   Doing well, just overall sore and tired with normal end of pregnancy discomforts. Her last baby came at 42 weeks. She has a wedding she needs to be at 2 weeks after her due date so needs to be done before then. Has not picked a date she would be induced if she has not delivered by then. Will think about it over the next week and let us know at her next appointment. Will plan postdates testing based on that. She got her new insurance. Decided to not do the allergy testing.   No other questions or concerns at this time.   Danger signs reviewed, pre-eclampsia signs and symptoms discussed.   Knows when to call triage and has phone numbers.   Follow up in 1 week.   HOA Hall CNM

## 2023-07-28 NOTE — PATIENT INSTRUCTIONS
Week 38 of Your Pregnancy: Care Instructions  Believe it or not, your baby is almost here. You may notice how your baby responds to sounds, warmth, cold, and light. You may even know what kind of music your baby likes.    Even if you expect a vaginal birth, it's a good idea to learn about  section ().  is the delivery of a baby through a cut (incision) in your belly. It's a major surgery, so it has more risks than a vaginal birth.   During the first 2 weeks after the birth, limit visitors. Don't allow visitors who have colds or infections. Ask visitors to wash their hands before they touch your baby. And never let anyone smoke around your baby.     Know about unplanned C-sections.  Reasons for an unplanned  include labor that slows or stops, signs of distress in your baby, and high blood pressure or other problems for you.     Know about planned C-sections.  If your baby isn't in a head-down position for delivery (breech position), your doctor may plan a . Or you may have a planned  if you're having twins or more.     Get as much help as you can while you're in the hospital.  You can learn about feeding, diapering, and bathing your baby.     Talk to your doctor or midwife about how to care for the umbilical cord stump.  If your baby will be circumcised, also ask about how to care for that.     Ask friends or family for help, as you need it.  If you can, have another adult in your home for at least 2 or 3 days after the birth.     Try to nap when your baby naps.  This may be your best chance to get some sleep.     Watch for changes in your mental health.  For the first 1 to 2 weeks after birth, it's common to cry or feel sad or irritable. If these feelings last for more than 2 weeks, you may have postpartum depression. Ask your doctor for help. Postpartum depression can be treated.   Follow-up care is a key part of your treatment and safety. Be sure to make and go  "to all appointments, and call your doctor if you are having problems. It's also a good idea to know your test results and keep a list of the medicines you take.  Where can you learn more?  Go to https://www.Venuemob.net/patiented  Enter B044 in the search box to learn more about \"Week 38 of Your Pregnancy: Care Instructions.\"  Current as of: 2022               Content Version: 13.7    8725-0428 [employer].   Care instructions adapted under license by your healthcare professional. If you have questions about a medical condition or this instruction, always ask your healthcare professional. [employer] disclaims any warranty or liability for your use of this information.      Week 39 of Your Pregnancy: Care Instructions    [place] babies can look different than what you see in pictures or movies. Their heads can be a strange shape right after birth. And they may have swollen eyes and red marks on their faces.   You can still get pregnant even if you are breastfeeding. If you don't want to get pregnant, talk to your doctor about birth control.   Tips for week 39 of pregnancy  If you plan to breastfeed, get prepared.     Continue to eat healthy foods.  Keep taking your prenatal vitamins during breastfeeding if your doctor recommends it.  Talk to your doctor before taking any medicines or supplements.  Choose the right birth control for you.     Intrauterine devices (IUDs) are placed in the uterus. Sometimes the IUD can be placed right after giving birth. They work for years.  Hormonal implants are placed under the skin of the arm. They also work for years.  Depo-Provera is a shot. You get it every 3 months.  Birth control pills can be used. They're taken every day.  Tubal ligation (tying your tubes) and vasectomy are surgeries. They're permanent.  Diaphragms, spermicide, and condoms must be used each time you have sex. If you used a diaphragm before, you should get refitted after " "the baby is born.  A birth control patch or ring can be used. These just can't be started until several weeks after you give birth.  Follow-up care is a key part of your treatment and safety. Be sure to make and go to all appointments, and call your doctor if you are having problems. It's also a good idea to know your test results and keep a list of the medicines you take.  Where can you learn more?  Go to https://www.Revolymer.net/patiented  Enter A811 in the search box to learn more about \"Week 39 of Your Pregnancy: Care Instructions.\"  Current as of: 2022               Content Version: 13.7    0667-9153 [employer].   Care instructions adapted under license by your healthcare professional. If you have questions about a medical condition or this instruction, always ask your healthcare professional. [employer] disclaims any warranty or liability for your use of this information.      Weeks 40 to 41 of Your Pregnancy: Care Instructions  By week 40, you've reached your due date. Your baby could be coming any day. Most babies born after their due dates are healthy. But you may have tests to make sure everything is okay. If you feel stressed, you could try a meditation exercise, such as guided imagery. It may help you relax.    If you are past 41 weeks, your doctor may measure the amount of fluid that surrounds your baby. They may also test your baby's movement and heart rate.   If you don't start labor on your own by 41 or 42 weeks, your doctor may want to start (induce) labor. If there are other concerns, your doctor may talk to you about a .   To start (induce) your labor, your doctor may do any of these things.    Place a narrow tube with a small balloon on the end (balloon catheter) into your cervix.  The doctor inflates the balloon. This helps your cervix open (dilate).     Sweep the membranes (separate the lining of the amniotic sac from the uterus).  This helps " "the uterus make a chemical that can start contractions.     \"Break your water\" (rupture the amniotic sac).  This may be done if your cervix has started to open.     Use medicines.  They may be used to soften the cervix or start contractions.   How to do guided imagery    Close your eyes, and take a few deep breaths. Picture a peaceful setting, such as a beach or a meadow. Add a winding path. Follow the path until you feel more and more relaxed.   Take a few minutes to breathe slowly and feel the calm. When you are ready, slowly take yourself back to the present. Count to 3, and open your eyes.   Follow-up care is a key part of your treatment and safety. Be sure to make and go to all appointments, and call your doctor if you are having problems. It's also a good idea to know your test results and keep a list of the medicines you take.  Where can you learn more?  Go to https://www.Buyt.In.Wildfang/patiented  Enter T922 in the search box to learn more about \"Weeks 40 to 41 of Your Pregnancy: Care Instructions.\"  Current as of: November 9, 2022               Content Version: 13.7    8632-5927 [employer].   Care instructions adapted under license by your healthcare professional. If you have questions about a medical condition or this instruction, always ask your healthcare professional. [employer] disclaims any warranty or liability for your use of this information.      Labor Induction  What You Need to Know  What is labor induction?  In most cases, it is best to go into labor naturally. When labor does not start on its own, we may use medicine or other methods to start (induce) labor. This is called induction, which:  Helps the uterus contract  Thins, softens and opens the cervix (opening of the uterus)  When is induction used?  There are two types of induction.  Medical induction may be done to protect the health of the parent or baby if:  There are medical concerns for you or your baby.  You " "haven't had your baby by 41 weeks.  Induction for non-medical reasons may be done at 39 weeks or later if:  You live far from the hospital.  You've been having contractions for many days.  You've given birth quickly in the past.   We will not perform an induction for non-medical reasons before 39 weeks. Studies show that babies born before 39 weeks may struggle with breathing, feeding, sleeping and staying warm. They are more likely to have health problems and may need to stay in the hospital longer. If we start your labor for medical reasons, the benefits will outweigh these risks. We will talk to you about your risks, benefits and alternatives (other options) before we start your labor.  How is induction done?  We may start your labor by doing one or more of the following:  We may need to help your cervix soften and open (sometimes called \"ripening\") to prepare it for labor. There are two ways to do this:  Medicines--these may be in the form of pills that you swallow or medicines that are put into the vagina next to the cervix.  Mechanical--using either a single or double balloon. These are small balloons that are attached to tubes that go up inside the cervix. The balloons are then filled with water to put pressure on the cervix and help the cervix soften and open. Depending on the type of balloon used, you may be allowed to go home after it is placed.  After your cervix is ready:  We may give you medicine through an IV (a small tube placed in your vein). This medicine is called Pitocin. It helps the muscles of your uterus to contract.  We may make a small opening in your bag of water (the sac around your baby). This is called an amniotomy. Sometimes called \"breaking the water\". It may help your uterus contract and your cervix open.  What might happen if my labor is induced?  Some of this depends on how your labor is started and how your body responds. Your labor may be more complicated. You and your baby may " need more medical treatments. In general:  You may not go into labor right away. If so, we may send you home with follow-up instructions.  It will be important to monitor you and your baby during the induction.  You may not be able to move around during labor. You will have two belts with monitors attached to your belly. These allow the nurse to watch your contractions and your baby's heart rate.  Your labor may take longer than if you went into labor on your own. It might take more than one day.  If you need cervical ripening, it is important to know that it may be many hours (even days) until delivery happens.  Cervical ripening can be slow and require several doses before your body is ready to labor.  A long labor may increase the risk of infection in mother and baby.  Your labor may not progress as planned. This could lead to a  birth.  Can I plan the date of my delivery?  After 39 weeks, you may ask about planning your delivery date. This is only an option if your body--and your baby--are ready. To find out, we will check your cervix and your baby's heart rate.   If you are ready to be induced, we will give you a date and time to come to the hospital. If many patients are in labor that day, we may need to start your labor at another time.  If you are not ready, we will not start your labor. It will be safer for your baby to come on its own.  What else do I need to know?  Before you have an induction, make sure you understand the reasons, risks and benefits. Ask your doctor or nurse-midwife:  Why do I need to be induced?  What are the risks to my baby?  How will you start my labor?  How will you know if my baby is ready to be born?  How will you know if my body is ready to give birth?  Where can I get more information?  To learn more about induction, you may visit these websites:  The American College of Nurse-Midwives:  www.mymidwife.org  The American College of Obstetricians and Gynecologists:  "www.acog.org  Childbirth Connection:  www.childbirthconnection.org   Dimes: www.marchofdimes.com  Association of Women's Health, Obstetrics, and  Nursing  www.scplvn6hqt.org/go-the-full-40&#047;  For informational purposes only. Not to replace the advice of your health care provider. Copyright   2008 [facility]. All rights reserved. Clinically reviewed by the  System Operations Leadership team. [employer] [id number] - REV .      Labor Induction  What You Need to Know  What is labor induction?  In most cases, it is best to go into labor naturally. When labor does not start on its own, we may use medicine or other methods to start (induce) labor. This is called induction, which:  Helps the uterus contract  Thins, softens and opens the cervix (opening of the uterus)  When is induction used?  There are two types of induction.  Medical induction may be done to protect the health of the parent or baby if:  There are medical concerns for you or your baby.  You haven't had your baby by 41 weeks.  Induction for non-medical reasons may be done at 39 weeks or later if:  You live far from the hospital.  You've been having contractions for many days.  You've given birth quickly in the past. We will not perform an induction for non-medical reasons before 39 weeks. Studies show that babies born before 39 weeks may struggle with breathing, feeding, sleeping and staying warm. They are more likely to have health problems and may need to stay in the hospital longer. If we start your labor for medical reasons, the benefits will outweigh these risks. We will talk to you about your risks, benefits and alternatives (other options) before we start your labor.  How is induction done?  We may start your labor by doing one or more of the following:  We may need to help your cervix soften and open (sometimes called \"ripening\") to prepare it for labor. There are two ways to do this:  Medicines--these may " "be in the form of pills that you swallow or medicines that are put into the vagina next to the cervix.  Mechanical--using either a single or double balloon. These are small balloons that are attached to tubes that go up inside the cervix. The balloons are then filled with water to put pressure on the cervix and help the cervix soften and open. Depending on the type of balloon used, you may be allowed to go home after it is placed.  After your cervix is ready:  We may give you medicine through an IV (a small tube placed in your vein). This medicine is called Pitocin. It helps the muscles of your uterus to contract.  We may make a small opening in your bag of water (the sac around your baby). This is called an amniotomy. Sometimes called \"breaking the water\". It may help your uterus contract and your cervix open.  What might happen if my labor is induced?  Some of this depends on how your labor is started and how your body responds. Your labor may be more complicated. You and your baby may need more medical treatments. In general:  You may not go into labor right away. If so, we may send you home with follow-up instructions.  It will be important to monitor you and your baby during the induction.  You may not be able to move around during labor. You will have two belts with monitors attached to your belly. These allow the nurse to watch your contractions and your baby's heart rate.  Your labor may take longer than if you went into labor on your own. It might take more than one day.  If you need cervical ripening, it is important to know that it may be many hours (even days) until delivery happens.  Cervical ripening can be slow and require several doses before your body is ready to labor.  A long labor may increase the risk of infection in mother and baby.  Your labor may not progress as planned. This could lead to a  birth.  Can I plan the date of my delivery?  After 39 weeks, you may ask about planning your " delivery date. This is only an option if your body--and your baby--are ready. To find out, we will check your cervix and your baby's heart rate.   If you are ready to be induced, we will give you a date and time to come to the hospital. If many patients are in labor that day, we may need to start your labor at another time.  If you are not ready, we will not start your labor. It will be safer for your baby to come on its own.  What else do I need to know?  Before you have an induction, make sure you understand the reasons, risks and benefits. Ask your doctor or nurse-midwife:  Why do I need to be induced?  What are the risks to my baby?  How will you start my labor?  How will you know if my baby is ready to be born?  How will you know if my body is ready to give birth?  Where can I get more information?  To learn more about induction, you may visit these websites:  The American College of Nurse-Midwives:  www.mymidwife.org  The American College of Obstetricians and Gynecologists: www.acog.org  Childbirth Connection:  www.childbirthconnection.org  March of Dimes: www.marchofdimes.com  Association of Women's Health, Obstetrics, and  Nursing  www.fjgxnb8hms.org/go-the-full-40&#047;  For informational purposes only. Not to replace the advice of your health care provider. Copyright   2008 [employer]. All rights reserved. Clinically reviewed by the  System Operations Leadership team. [employer] [id number] - REV .

## 2023-08-01 ENCOUNTER — PRENATAL OFFICE VISIT (OUTPATIENT)
Dept: MIDWIFE SERVICES | Facility: CLINIC | Age: 23
End: 2023-08-01
Payer: COMMERCIAL

## 2023-08-01 VITALS
BODY MASS INDEX: 31.2 KG/M2 | WEIGHT: 187.5 LBS | HEART RATE: 105 BPM | SYSTOLIC BLOOD PRESSURE: 123 MMHG | DIASTOLIC BLOOD PRESSURE: 73 MMHG | OXYGEN SATURATION: 97 %

## 2023-08-01 DIAGNOSIS — Z34.83 ENCOUNTER FOR SUPERVISION OF OTHER NORMAL PREGNANCY IN THIRD TRIMESTER: Primary | ICD-10-CM

## 2023-08-01 PROCEDURE — 99207 PR PRENATAL VISIT: CPT | Performed by: ADVANCED PRACTICE MIDWIFE

## 2023-08-01 NOTE — PROGRESS NOTES
S: Feels well,  Baby active.  Denies  vaginal bleeding or leaking of fluid. No headache, increase in edema, no epigastric pain.     Would like to discuss IOL today. States she is in a wedding on August 18. She is hoping to avoid induction and go into labor on her own, but would consider IOL at 41wks if she has not had her baby  yet. Requesting cervical exam. Discussed risks/benefits of membrane sweep with GBS+ status.     O: Vitals: /73   Pulse 105   Wt 85 kg (187 lb 8 oz)   LMP 10/12/2022   SpO2 97%   BMI 31.20 kg/m    BMI= Body mass index is 31.2 kg/m .  Exam:  Constitutional: healthy, alert and no distress  Respiratory: respirations even and unlabored  Gastrointestinal: Abdomen soft, non-tender. Fundus measures appropriate for gestational age. Fetal heart tones hear without difficulty and within normal limits  : Normal external genitalia without lesions  Cervix 2cm / 75% / -2, mid position, soft, membranes swept with pt consent   Psychiatric: mentation appears normal and affect normal/bright  A:     ICD-10-CM    1. Encounter for supervision of other normal pregnancy in third trimester  Z34.83         P: Labor signs and symptoms discussed, aware of numbers to call.  Discussed warning signs of PIH/preeclampsia and patient will monitor.  GBS screen completed. Discussed plans for labor.   Discussed postdates options - follow up 8/7, can schedule postdates testing or IOL at that point   Encouraged patient to call with any questions or concerns.  Return to clinic 1 weeks    HOA Osman, CHASTITY

## 2023-08-03 ENCOUNTER — HOSPITAL ENCOUNTER (INPATIENT)
Facility: CLINIC | Age: 23
LOS: 2 days | Discharge: HOME HEALTH | End: 2023-08-05
Attending: ADVANCED PRACTICE MIDWIFE | Admitting: ADVANCED PRACTICE MIDWIFE
Payer: COMMERCIAL

## 2023-08-03 ENCOUNTER — TELEPHONE (OUTPATIENT)
Dept: MIDWIFE SERVICES | Facility: CLINIC | Age: 23
End: 2023-08-03
Payer: COMMERCIAL

## 2023-08-03 ENCOUNTER — NURSE TRIAGE (OUTPATIENT)
Dept: NURSING | Facility: CLINIC | Age: 23
End: 2023-08-03
Payer: COMMERCIAL

## 2023-08-03 PROBLEM — Z37.9 NORMAL LABOR: Status: ACTIVE | Noted: 2023-08-03

## 2023-08-03 LAB
ABO/RH(D): NORMAL
ANTIBODY SCREEN: NEGATIVE
ERYTHROCYTE [DISTWIDTH] IN BLOOD BY AUTOMATED COUNT: 14.3 % (ref 10–15)
HCT VFR BLD AUTO: 35 % (ref 35–47)
HGB BLD-MCNC: 11.2 G/DL (ref 11.7–15.7)
MCH RBC QN AUTO: 23.1 PG (ref 26.5–33)
MCHC RBC AUTO-ENTMCNC: 32 G/DL (ref 31.5–36.5)
MCV RBC AUTO: 72 FL (ref 78–100)
PLATELET # BLD AUTO: 193 10E3/UL (ref 150–450)
RBC # BLD AUTO: 4.85 10E6/UL (ref 3.8–5.2)
SPECIMEN EXPIRATION DATE: NORMAL
T PALLIDUM AB SER QL: NONREACTIVE
WBC # BLD AUTO: 11.6 10E3/UL (ref 4–11)

## 2023-08-03 PROCEDURE — 250N000009 HC RX 250

## 2023-08-03 PROCEDURE — 59400 OBSTETRICAL CARE: CPT | Performed by: ADVANCED PRACTICE MIDWIFE

## 2023-08-03 PROCEDURE — 10907ZC DRAINAGE OF AMNIOTIC FLUID, THERAPEUTIC FROM PRODUCTS OF CONCEPTION, VIA NATURAL OR ARTIFICIAL OPENING: ICD-10-PCS | Performed by: ADVANCED PRACTICE MIDWIFE

## 2023-08-03 PROCEDURE — 86780 TREPONEMA PALLIDUM: CPT | Performed by: ADVANCED PRACTICE MIDWIFE

## 2023-08-03 PROCEDURE — 120N000002 HC R&B MED SURG/OB UMMC

## 2023-08-03 PROCEDURE — 86901 BLOOD TYPING SEROLOGIC RH(D): CPT | Performed by: ADVANCED PRACTICE MIDWIFE

## 2023-08-03 PROCEDURE — 250N000013 HC RX MED GY IP 250 OP 250 PS 637: Performed by: ADVANCED PRACTICE MIDWIFE

## 2023-08-03 PROCEDURE — 722N000001 HC LABOR CARE VAGINAL DELIVERY SINGLE

## 2023-08-03 PROCEDURE — 258N000003 HC RX IP 258 OP 636: Performed by: ADVANCED PRACTICE MIDWIFE

## 2023-08-03 PROCEDURE — 86850 RBC ANTIBODY SCREEN: CPT | Performed by: ADVANCED PRACTICE MIDWIFE

## 2023-08-03 PROCEDURE — 85027 COMPLETE CBC AUTOMATED: CPT | Performed by: ADVANCED PRACTICE MIDWIFE

## 2023-08-03 PROCEDURE — 258N000003 HC RX IP 258 OP 636

## 2023-08-03 PROCEDURE — 85041 AUTOMATED RBC COUNT: CPT | Performed by: ADVANCED PRACTICE MIDWIFE

## 2023-08-03 PROCEDURE — 250N000011 HC RX IP 250 OP 636: Performed by: ADVANCED PRACTICE MIDWIFE

## 2023-08-03 RX ORDER — NALOXONE HYDROCHLORIDE 0.4 MG/ML
0.4 INJECTION, SOLUTION INTRAMUSCULAR; INTRAVENOUS; SUBCUTANEOUS
Status: DISCONTINUED | OUTPATIENT
Start: 2023-08-03 | End: 2023-08-03

## 2023-08-03 RX ORDER — CARBOPROST TROMETHAMINE 250 UG/ML
250 INJECTION, SOLUTION INTRAMUSCULAR
Status: DISCONTINUED | OUTPATIENT
Start: 2023-08-03 | End: 2023-08-05 | Stop reason: HOSPADM

## 2023-08-03 RX ORDER — PROCHLORPERAZINE 25 MG
25 SUPPOSITORY, RECTAL RECTAL EVERY 12 HOURS PRN
Status: DISCONTINUED | OUTPATIENT
Start: 2023-08-03 | End: 2023-08-03

## 2023-08-03 RX ORDER — TRANEXAMIC ACID 10 MG/ML
1 INJECTION, SOLUTION INTRAVENOUS EVERY 30 MIN PRN
Status: DISCONTINUED | OUTPATIENT
Start: 2023-08-03 | End: 2023-08-03

## 2023-08-03 RX ORDER — ONDANSETRON 4 MG/1
4 TABLET, ORALLY DISINTEGRATING ORAL EVERY 6 HOURS PRN
Status: DISCONTINUED | OUTPATIENT
Start: 2023-08-03 | End: 2023-08-03

## 2023-08-03 RX ORDER — KETOROLAC TROMETHAMINE 30 MG/ML
30 INJECTION, SOLUTION INTRAMUSCULAR; INTRAVENOUS
Status: DISCONTINUED | OUTPATIENT
Start: 2023-08-03 | End: 2023-08-03

## 2023-08-03 RX ORDER — NALBUPHINE HYDROCHLORIDE 20 MG/ML
2.5-5 INJECTION, SOLUTION INTRAMUSCULAR; INTRAVENOUS; SUBCUTANEOUS EVERY 6 HOURS PRN
Status: DISCONTINUED | OUTPATIENT
Start: 2023-08-03 | End: 2023-08-03

## 2023-08-03 RX ORDER — METOCLOPRAMIDE HYDROCHLORIDE 5 MG/ML
10 INJECTION INTRAMUSCULAR; INTRAVENOUS EVERY 6 HOURS PRN
Status: DISCONTINUED | OUTPATIENT
Start: 2023-08-03 | End: 2023-08-03

## 2023-08-03 RX ORDER — MODIFIED LANOLIN
OINTMENT (GRAM) TOPICAL
Status: DISCONTINUED | OUTPATIENT
Start: 2023-08-03 | End: 2023-08-05 | Stop reason: HOSPADM

## 2023-08-03 RX ORDER — ONDANSETRON 2 MG/ML
4 INJECTION INTRAMUSCULAR; INTRAVENOUS EVERY 6 HOURS PRN
Status: DISCONTINUED | OUTPATIENT
Start: 2023-08-03 | End: 2023-08-03

## 2023-08-03 RX ORDER — MISOPROSTOL 200 UG/1
400 TABLET ORAL
Status: DISCONTINUED | OUTPATIENT
Start: 2023-08-03 | End: 2023-08-03

## 2023-08-03 RX ORDER — METHYLERGONOVINE MALEATE 0.2 MG/ML
200 INJECTION INTRAVENOUS
Status: DISCONTINUED | OUTPATIENT
Start: 2023-08-03 | End: 2023-08-03

## 2023-08-03 RX ORDER — OXYTOCIN/0.9 % SODIUM CHLORIDE 30/500 ML
100-340 PLASTIC BAG, INJECTION (ML) INTRAVENOUS CONTINUOUS PRN
Status: DISCONTINUED | OUTPATIENT
Start: 2023-08-03 | End: 2023-08-03

## 2023-08-03 RX ORDER — FENTANYL CITRATE 50 UG/ML
100 INJECTION, SOLUTION INTRAMUSCULAR; INTRAVENOUS
Status: DISCONTINUED | OUTPATIENT
Start: 2023-08-03 | End: 2023-08-03

## 2023-08-03 RX ORDER — OXYTOCIN 10 [USP'U]/ML
10 INJECTION, SOLUTION INTRAMUSCULAR; INTRAVENOUS
Status: DISCONTINUED | OUTPATIENT
Start: 2023-08-03 | End: 2023-08-03

## 2023-08-03 RX ORDER — OXYTOCIN/0.9 % SODIUM CHLORIDE 30/500 ML
340 PLASTIC BAG, INJECTION (ML) INTRAVENOUS CONTINUOUS PRN
Status: DISCONTINUED | OUTPATIENT
Start: 2023-08-03 | End: 2023-08-05 | Stop reason: HOSPADM

## 2023-08-03 RX ORDER — FENTANYL CITRATE-0.9 % NACL/PF 10 MCG/ML
100 PLASTIC BAG, INJECTION (ML) INTRAVENOUS EVERY 5 MIN PRN
Status: DISCONTINUED | OUTPATIENT
Start: 2023-08-03 | End: 2023-08-03

## 2023-08-03 RX ORDER — NALOXONE HYDROCHLORIDE 0.4 MG/ML
0.2 INJECTION, SOLUTION INTRAMUSCULAR; INTRAVENOUS; SUBCUTANEOUS
Status: DISCONTINUED | OUTPATIENT
Start: 2023-08-03 | End: 2023-08-03

## 2023-08-03 RX ORDER — HYDROCORTISONE 25 MG/G
CREAM TOPICAL 3 TIMES DAILY PRN
Status: DISCONTINUED | OUTPATIENT
Start: 2023-08-03 | End: 2023-08-05 | Stop reason: HOSPADM

## 2023-08-03 RX ORDER — ACETAMINOPHEN 325 MG/1
650 TABLET ORAL EVERY 4 HOURS PRN
Status: DISCONTINUED | OUTPATIENT
Start: 2023-08-03 | End: 2023-08-05 | Stop reason: HOSPADM

## 2023-08-03 RX ORDER — OXYTOCIN 10 [USP'U]/ML
10 INJECTION, SOLUTION INTRAMUSCULAR; INTRAVENOUS
Status: DISCONTINUED | OUTPATIENT
Start: 2023-08-03 | End: 2023-08-05 | Stop reason: HOSPADM

## 2023-08-03 RX ORDER — METHYLERGONOVINE MALEATE 0.2 MG/ML
200 INJECTION INTRAVENOUS
Status: DISCONTINUED | OUTPATIENT
Start: 2023-08-03 | End: 2023-08-05 | Stop reason: HOSPADM

## 2023-08-03 RX ORDER — METOCLOPRAMIDE 10 MG/1
10 TABLET ORAL EVERY 6 HOURS PRN
Status: DISCONTINUED | OUTPATIENT
Start: 2023-08-03 | End: 2023-08-03

## 2023-08-03 RX ORDER — SODIUM CHLORIDE, SODIUM LACTATE, POTASSIUM CHLORIDE, CALCIUM CHLORIDE 600; 310; 30; 20 MG/100ML; MG/100ML; MG/100ML; MG/100ML
INJECTION, SOLUTION INTRAVENOUS
Status: COMPLETED
Start: 2023-08-03 | End: 2023-08-03

## 2023-08-03 RX ORDER — CITRIC ACID/SODIUM CITRATE 334-500MG
30 SOLUTION, ORAL ORAL
Status: DISCONTINUED | OUTPATIENT
Start: 2023-08-03 | End: 2023-08-03

## 2023-08-03 RX ORDER — IBUPROFEN 800 MG/1
800 TABLET, FILM COATED ORAL EVERY 6 HOURS PRN
Status: DISCONTINUED | OUTPATIENT
Start: 2023-08-03 | End: 2023-08-05 | Stop reason: HOSPADM

## 2023-08-03 RX ORDER — OXYTOCIN/0.9 % SODIUM CHLORIDE 30/500 ML
PLASTIC BAG, INJECTION (ML) INTRAVENOUS
Status: COMPLETED
Start: 2023-08-03 | End: 2023-08-03

## 2023-08-03 RX ORDER — SODIUM CHLORIDE, SODIUM LACTATE, POTASSIUM CHLORIDE, CALCIUM CHLORIDE 600; 310; 30; 20 MG/100ML; MG/100ML; MG/100ML; MG/100ML
INJECTION, SOLUTION INTRAVENOUS CONTINUOUS
Status: DISCONTINUED | OUTPATIENT
Start: 2023-08-03 | End: 2023-08-03

## 2023-08-03 RX ORDER — CLINDAMYCIN PHOSPHATE 900 MG/50ML
900 INJECTION, SOLUTION INTRAVENOUS EVERY 8 HOURS
Status: DISCONTINUED | OUTPATIENT
Start: 2023-08-03 | End: 2023-08-03

## 2023-08-03 RX ORDER — FENTANYL/ROPIVACAINE/NS/PF 2MCG/ML-.1
PLASTIC BAG, INJECTION (ML) EPIDURAL
Status: DISCONTINUED | OUTPATIENT
Start: 2023-08-03 | End: 2023-08-03

## 2023-08-03 RX ORDER — PROCHLORPERAZINE MALEATE 10 MG
10 TABLET ORAL EVERY 6 HOURS PRN
Status: DISCONTINUED | OUTPATIENT
Start: 2023-08-03 | End: 2023-08-03

## 2023-08-03 RX ORDER — BISACODYL 10 MG
10 SUPPOSITORY, RECTAL RECTAL DAILY PRN
Status: DISCONTINUED | OUTPATIENT
Start: 2023-08-03 | End: 2023-08-05 | Stop reason: HOSPADM

## 2023-08-03 RX ORDER — DOCUSATE SODIUM 100 MG/1
100 CAPSULE, LIQUID FILLED ORAL DAILY
Status: DISCONTINUED | OUTPATIENT
Start: 2023-08-03 | End: 2023-08-05 | Stop reason: HOSPADM

## 2023-08-03 RX ORDER — CARBOPROST TROMETHAMINE 250 UG/ML
250 INJECTION, SOLUTION INTRAMUSCULAR
Status: DISCONTINUED | OUTPATIENT
Start: 2023-08-03 | End: 2023-08-03

## 2023-08-03 RX ORDER — MISOPROSTOL 200 UG/1
800 TABLET ORAL
Status: DISCONTINUED | OUTPATIENT
Start: 2023-08-03 | End: 2023-08-03

## 2023-08-03 RX ORDER — LIDOCAINE HYDROCHLORIDE 10 MG/ML
INJECTION, SOLUTION EPIDURAL; INFILTRATION; INTRACAUDAL; PERINEURAL
Status: DISCONTINUED
Start: 2023-08-03 | End: 2023-08-03 | Stop reason: WASHOUT

## 2023-08-03 RX ORDER — MISOPROSTOL 200 UG/1
400 TABLET ORAL ONCE
Status: COMPLETED | OUTPATIENT
Start: 2023-08-03 | End: 2023-08-03

## 2023-08-03 RX ORDER — OXYTOCIN/0.9 % SODIUM CHLORIDE 30/500 ML
340 PLASTIC BAG, INJECTION (ML) INTRAVENOUS CONTINUOUS PRN
Status: DISCONTINUED | OUTPATIENT
Start: 2023-08-03 | End: 2023-08-03

## 2023-08-03 RX ORDER — MISOPROSTOL 200 UG/1
400 TABLET ORAL
Status: DISCONTINUED | OUTPATIENT
Start: 2023-08-03 | End: 2023-08-05 | Stop reason: HOSPADM

## 2023-08-03 RX ORDER — IBUPROFEN 800 MG/1
800 TABLET, FILM COATED ORAL
Status: DISCONTINUED | OUTPATIENT
Start: 2023-08-03 | End: 2023-08-03

## 2023-08-03 RX ORDER — OXYTOCIN 10 [USP'U]/ML
INJECTION, SOLUTION INTRAMUSCULAR; INTRAVENOUS
Status: DISCONTINUED
Start: 2023-08-03 | End: 2023-08-03 | Stop reason: HOSPADM

## 2023-08-03 RX ORDER — TRANEXAMIC ACID 10 MG/ML
1 INJECTION, SOLUTION INTRAVENOUS EVERY 30 MIN PRN
Status: DISCONTINUED | OUTPATIENT
Start: 2023-08-03 | End: 2023-08-05 | Stop reason: HOSPADM

## 2023-08-03 RX ORDER — MISOPROSTOL 200 UG/1
800 TABLET ORAL
Status: DISCONTINUED | OUTPATIENT
Start: 2023-08-03 | End: 2023-08-05 | Stop reason: HOSPADM

## 2023-08-03 RX ADMIN — MISOPROSTOL 400 MCG: 200 TABLET ORAL at 11:34

## 2023-08-03 RX ADMIN — Medication 340 ML/HR: at 10:31

## 2023-08-03 RX ADMIN — IBUPROFEN 800 MG: 800 TABLET ORAL at 22:37

## 2023-08-03 RX ADMIN — SODIUM CHLORIDE, POTASSIUM CHLORIDE, SODIUM LACTATE AND CALCIUM CHLORIDE: 600; 310; 30; 20 INJECTION, SOLUTION INTRAVENOUS at 09:45

## 2023-08-03 RX ADMIN — ACETAMINOPHEN 650 MG: 325 TABLET, FILM COATED ORAL at 17:27

## 2023-08-03 RX ADMIN — IBUPROFEN 800 MG: 800 TABLET ORAL at 16:47

## 2023-08-03 RX ADMIN — ACETAMINOPHEN 650 MG: 325 TABLET, FILM COATED ORAL at 13:28

## 2023-08-03 RX ADMIN — KETOROLAC TROMETHAMINE 30 MG: 30 INJECTION, SOLUTION INTRAMUSCULAR; INTRAVENOUS at 10:45

## 2023-08-03 RX ADMIN — SODIUM CHLORIDE, POTASSIUM CHLORIDE, SODIUM LACTATE AND CALCIUM CHLORIDE 1000 ML: 600; 310; 30; 20 INJECTION, SOLUTION INTRAVENOUS at 08:36

## 2023-08-03 RX ADMIN — ACETAMINOPHEN 650 MG: 325 TABLET, FILM COATED ORAL at 20:57

## 2023-08-03 RX ADMIN — CLINDAMYCIN PHOSPHATE 900 MG: 900 INJECTION, SOLUTION INTRAVENOUS at 08:37

## 2023-08-03 ASSESSMENT — ACTIVITIES OF DAILY LIVING (ADL)
ADLS_ACUITY_SCORE: 20

## 2023-08-03 NOTE — TELEPHONE ENCOUNTER
Contractions since 230am, about every 10m and getting stronger. The last few have been more like 8m apart. Lasting about 1-2m. Had membranes swept on Tues, has had some bloody discharge since then. Having some extra discharge, doesn't think her water is broke. Baby is moving well. Group strep is positive. SVE on Tues was 2/75/-2.    Recommend that [facility] come [facility] to be [facility] and get antibiotics started soon due to multiparity and closely spaced pregnancies, as labor can go quickly.    Cb Barrow, CHASTITY

## 2023-08-03 NOTE — L&D DELIVERY NOTE
Delivery Summary    Giselle Edwards MRN# [id number]   Age: 23 year old YOB: 2000     Brief Labor Course:  Pt started in spontaneous labor early this morning around 0230.  Arrived at hospital around 0730 and was admitted @ 0800. SVE was 6-7 cm dilated. Clindamycin was given for GBS prophylaxis (amox allergy and clinda susceptible) @ 0830. Pt continued to labor beautifully, supported by her  [place].  We were waiting for the anesthesia team to be available for an epidural, however when they arrived, pt was nearing the end of her labor and declined.  Pt was found to be complete and +2, AROM accomplished for copious amts cl fluid.  Pt began spontaneously bearing down, brought baby up to a crown, and then gently eased the head out, shoulders easily followed.  Nuchal cord, and wrapped around body and under both arms.  Had to unroll baby from her cord, she gave a lusty cry as I was doing this.    Baby was pinking up and had great tone and was placed on mom's abdomen for remainder of recovery.    IV pitocin started.  Placenta delivered via suarez mechanism.  Perineum inspected and found to have a minor perineal abrasion, hemostatic, left unrepaired.    Mother and baby stable as I left the room.  It was a jose alfredo to attend this beautiful birth.      Complications of this birth: GBS+ with inadequate treatment.       Grace, Female-Giselle [[id number]]      Labor Event Times      Latent labor onset date/time: 8/3/2023 0230    Active labor onset date: 23 Onset time:  8:07 AM CDT   Dilation complete date: 8/3/23 Complete time: 10:24 AM   Start pushing date/time: 8/3/2023 1024          Labor Length      2nd Stage (hrs): 0 (min): 5   3rd Stage (hrs): 0 (min): 6          Labor Events     labor?: No   steroids: None  Labor Type: Spontaneous  Predominate monitoring during 1st stage: intermittent auscultation, continuous electronic fetal monitoring     Antibiotics received during labor?: Yes  Reason  for Antibiotics: GBS  Antibiotics received for GBS: Clindamycin  Antibiotics Given (GBS): Less than or equal to 4 hours prior to delivery       Rupture date/time: 8/3/23 1024   Rupture type: Artificial Rupture of Membranes  Fluid color: Clear  Fluid odor: Normal     Augmentation: None       Delivery/Placenta Date and Time      Delivery Date: 8/3/23 Delivery Time: 10:29 AM   Placenta Date/Time: 8/3/2023 10:35 AM  Oxytocin given at the time of delivery: after delivery of baby  Delivering clinician: Laura Goldsmith APRN CNM   Other personnel present at delivery:  Provider Role   Sasha Suarez RN Registered Nurse   María Cazares RN Registered Nurse             Vaginal Counts       Initial count performed by 2 team members:  Two Team Members   ROCK Suarez RN         Needles Suture Needles Sponges (RETIRED) Instruments   Initial counts 2 0 5    Added to count 0 0 0    Relief counts       Final counts 2 0 5            Placed during labor Accounted for at the end of labor   FSE NA NA   IUPC NA NA   Cervidil NA NA                  Final count performed by 2 team members:  Two Team Members   ROCK Suarez RN      Final count correct?: Yes  Pre-Birth Team Brief: Complete  Post-Birth Team Debrief: Complete       Apgars    Living status: Living   1 Minute 5 Minute 10 Minute 15 Minute 20 Minute   Skin color: 1  1       Heart rate: 2  2       Reflex irritability: 2  2       Muscle tone: 2  2       Respiratory effort: 2  2       Total: 9  9       Apgars assigned by: JOSIAS SUAREZ RN       Cord      Vessels: 3 Vessels    Cord Complications: Nuchal   Nuchal Intervention: delivered through         Nuchal cord description: loose nuchal cord         Cord Blood Disposition: Discard    Gases Sent?: No    Delayed cord clamping?: Yes    Cord Clamping Delay (seconds):  seconds    Stem cell collection?: No           [place] Resuscitation    Methods: None   Care at Delivery: Spontaneous vigorous  cry, baby skin to skin at delivery.  Output in Delivery Room: Voided       [place] Measurements    Birth Comments: nuchal cord and around shoulders  Output in delivery room: Voided       Skin to Skin and Feeding Plan      Skin to skin initiation date/time: 1841    Skin to skin with: Mother  Skin to skin end date/time:            Labor Events and Shoulder Dystocia    Fetal Tracing Prior to Delivery: Category 1  Fetal Tracing Comments: R-NST when admitted, monitored with AI during labor  Shoulder dystocia present?: Neg       Delivery (Maternal) (Provider to Complete) (001027)    Episiotomy: None  Perineal lacerations: None    Repair suture: None  Genital tract inspection done: Pos       Blood Loss  Mother: Giselle Edwards #[id number]     Start of Mother's Information      Delivery Blood Loss  23 0807 - 23 1103         Unable to report on blood loss         Start time is after end time. Verify documentation for labor onset date/time,  procedure start date/time, and birth date/time.            End of Mother's Information  Mother: Giselle Edwards #[id number]                Delivery - Provider to Complete ([id number])    Delivering clinician: Laura Goldsmith APRN CNM  Delivery Type (Choose the 1 that will go to the Birth History): Vaginal, Spontaneous                         Other personnel:  Provider Role   Sasha Woodard RN Registered Nurse   María Cazares RN Registered Nurse                    Placenta    Date/Time: 8/3/2023 10:35 AM  Removal: Spontaneous  Comments: Cord was looped under both baby's arms then around neck, had to unroll her form the cord  Disposition: Hospital disposal             Anesthesia    Method: None                    Presentation and Position    Presentation: Vertex    Position: Left Occiput Anterior                     HOA Combs CNM   [FreeTextEntry1] : Patient is a 53 year old woman with PMHx of migraines, presented to the ED at [facility] (4/5/2022) after experiencing worsening headache with vomiting followed by seizure.  Stroke protocol was called and CT head showed asymmetric effacement of the left cerebral sulci, which could reflect mild cerebral edema.  There is a 1 cm hypodensity in the left occipital lobe. This could reflect small age indeterminate infarct versus small mass. Recommend further characterization with contrast-enhanced MRI.   There is no intraparenchymal hematoma or midline shift. No abnormal extra-axial fluid collections are present.\par \par Her work up includes:\par \par 4/6/2022 MRI brain\par Enhancing cystic/necrotic lesion within the left occipital lobe measuring 2.2 cm. Smaller rim-enhancing lesion in the medial left periatrial region / splenium measuring measuring 7 mm. Findings are most compatible with neoplastic etiology (primary multifocal versus metastatic).\par Expansile nonenhancing FLAIR signal abnormality involving the splenium of the corpus callosum with extension along the occipital and temporal horns greater on the left. Additional faint diffuse white matter signal abnormality. Findings are also likely neoplastic.\par  Leptomeningeal signal abnormality (FLAIR hyperintense, faintly hyperenhancing) within the left frontal and parietal sulci suspicious for leptomeningeal involvement.\par \par 4/6/2022 CT A/P No CT evidence of acute or metastatic disease within the abdomen or pelvis.\par \par 4/11/2022, Dx LP done and CSF pathology shows, paucicelluar specimen, rare cells with degenerative changes, no definite\par malignant cells identified.\par \par On 4/12/2022, she underwent a left occipital brain tumor biopsy with Dr. 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 Tumor tissue:  Negative for MGMT promoter methylation.  IDH pending\par \par 4/13/2022 XR Skull impression: Post left occipital craniotomy with bone plate. Focal air focus deep to the bone plate, consistent with postsurgical cavity. No other inadvertent radiopaque foreign body.\par \par 4/13/2022 CT head Status post left occipital craniectomy/cranioplasty with expected postsurgical changes. Left occipital lobe gas-containing surgical cavity.  Increased paranasal sinus inflammatory changes.\par \par She has established care with Dr. Kennedy, plan to start Temozlomide (Temodar). \par \par She has recovered well since her procedure and is here with her spouse, Anselmo, to discuss radiation.  Patient reports having a migraine headache today, takes gabapentin for it, without much relief.  She can not recall the name of her neurologist.    She continues to have vision changes, right eye vision worse since procedure.  \par \par Dr. Dumont 6/27/2022\par Dr. Kennedy

## 2023-08-03 NOTE — PLAN OF CARE
Goal Outcome Evaluation:  Shift [id number]  Afebrile. VSS, except 1-2/10 cramping. Fundus U1, scant, rubra lochia. LS clear on RA. Good PO intake, good appetite. Voiding independently, passing gas. Taking IBU and tylenol (PRN meds) as needed. Bonding well with infant in room. Helping with infant cares. Patient is breast feeding every 2-3 hours. Plan to continue monitoring. Hourly monitoring completed, will continue to monitor.     Problem: Plan of Care - These are the overarching goals to be used throughout the patient stay.    Goal: Plan of Care Review  Description: The Plan of Care Review/Shift note should be completed every shift.  The Outcome Evaluation is a brief statement about your assessment that the patient is improving, declining, or no change.  This information will be displayed automatically on your shift note.  Outcome: Progressing  Goal: Absence of Hospital-Acquired Illness or Injury  Intervention: Prevent Skin Injury  Recent Flowsheet Documentation  Taken 8/3/2023 1300 by Raisa Desai RN  Body Position: position changed independently  Taken 8/3/2023 1256 by Raisa Desai RN  Body Position: position changed independently  Goal: Optimal Comfort and Wellbeing  Outcome: Progressing  Intervention: Provide Person-Centered Care  Recent Flowsheet Documentation  Taken 8/3/2023 1256 by Raisa Desai RN  Trust Relationship/Rapport:   care explained   choices provided   emotional support provided   empathic listening provided   questions answered   questions encouraged   reassurance provided   thoughts/feelings acknowledged  Goal: Readiness for Transition of Care  Outcome: Progressing     Problem: Postpartum (Vaginal Delivery)  Goal: Successful Maternal Role Transition  Outcome: Progressing  Goal: Anesthesia/Sedation Recovery  Outcome: Unable to Meet  Goal: Optimal Pain Control and Function  Outcome: Progressing

## 2023-08-03 NOTE — DISCHARGE INSTRUCTIONS

## 2023-08-03 NOTE — TELEPHONE ENCOUNTER
OB Triage Call      Is patient's OB/Midwife with the formerly [facility] or [facility]? [facility]- Proceed with triage     Reason for call:     Patient calling requesting to speak with on call midwife.  Reporting irregular contractions all day yesterday. Waking from sleep at 230 a.m.. Contractions now every 8-10 minutes.  Fetal movement is positive.  Vaginal spotting since membrane sweeping on 23    Plan: Labor and Delivery    Patient plans to deliver at [facility]    Patient's primary OB Provider is Midwifes.      Per protocol recommendations Consult needed for [facility] MD or Midwife.  Patient's primary OB is [facility]. Paged on-call midwife for patient's primary OB clinic (refer to where patient is seen as midwives may go to multiple locations) [facility] [place] to call FNA back at .  Call returned at 627 a.m. and advised on triage assessment. Does midwife recommend L&D evaluation? Unknown- CNM contacted patient directly to develop plan of care. No further actions needed at this time.       Is patient's delivering hospital on divert? No      39w6d    Estimated Date of Delivery: Aug 4, 2023        OB History    Para Term  AB Living   2 1 1 0 0 1   SAB IAB Ectopic Multiple Live Births   0 0 0 0 1      # Outcome Date GA Lbr Addison/2nd Weight Sex Delivery Anes PTL Lv   2 Current            1 Term 22 42w0d  3.657 kg (8 lb 1 oz) F  None  CJ       No results found for: GBS       Jaelyn Manrique RN 23 6:16 AM  [facility] Nurse Advisor  Reason for Disposition   [1] History of prior delivery (multipara) AND [2] contractions < 10 minutes apart AND [3] present 1 hour    Additional Information   Negative: Passed out (i.e., lost consciousness, collapsed and was not responding)   Negative: Shock suspected (e.g., cold/pale/clammy skin, too weak to stand, low BP, rapid pulse)   Negative: Difficult to awaken or acting confused (e.g., disoriented, slurred  "speech)   Negative: [1] SEVERE abdominal pain (e.g., excruciating) AND [2] constant AND [3] present > 1 hour   Negative: Severe bleeding (e.g., continuous red blood from vagina, or large blood clots)   Negative: Umbilical cord hanging out of the vagina (shiny, white, curled appearance, \"like telephone cord\")   Negative: Uncontrollable urge to push (i.e., feels like baby is coming out now)   Negative: Can see baby   Negative: Sounds like a life-threatening emergency to the triager   Negative: Pregnant < 37 weeks (i.e., )   Negative: [1] Uncertain delivery date AND [2] possibly pregnant < 37 weeks (i.e., )   Negative: [1] First baby (primipara) AND [2] contractions < 6 minutes apart  AND [3] present 2 hours    Protocols used: Pregnancy - Labor-A-    "

## 2023-08-03 NOTE — PLAN OF CARE
Data: Patient admitted to room triage  at 0723. Patient is a . Prenatal record reviewed.   OB History    Para Term  AB Living   2 1 1 0 0 1   SAB IAB Ectopic Multiple Live Births   0 0 0 0 1      # Outcome Date GA Lbr Addison/2nd Weight Sex Delivery Anes PTL Lv   2 Current            1 Term 22 42w0d  3.657 kg (8 lb 1 oz) F  None  CJ   .  Medical History:   Past Medical History:   Diagnosis Date    Carrier of group B Streptococcus     Irregular menstrual cycle     Migraine with aura     Sciatica of right side     Simple or unspecified chronic serous otitis media     Unspecified constipation     Varicose veins of lower extremity    .  Gestational age 39w6d. Vital signs per doc flowsheet. Fetal movement present. Patient reports Laboring   as reason for admission. Denies LOF having bloody show since her membranes were stripped on Tuesday.  Denies S/S pre-eclampsia. Desires epidural in labor.  Support person present.  Action: Verbal consent for EFM, external fetal monitors applied. Admission assessment completed. Patient and support persons educated on labor process. Patient instructed to report change in fetal movement, contractions, vaginal leaking of fluid or bleeding, abdominal pain, or any concerns related to the pregnancy to her nurse/physician. Patient oriented to room, call light in reach.   Response: ROCK Goldsmith CNM informed of triage admit. Plan per provider is LAURYNE, 6.5/  plan admission, antibiotics for GBS+, patient desires epidural. Patient verbalized understanding of education and verbalized agreement with plan. Patient coping with labor via Breathing, relaxation.      Goal Outcome Evaluation:      Plan of Care Reviewed With: patient, spouse

## 2023-08-03 NOTE — PROGRESS NOTES
"Subjective :   Pt breathing with contractions, waiting for epidural, reports feeling more pressure     Objective:   /61 (BP Location: Left arm, Patient Position: Semi-Leon's, Cuff Size: Adult Regular)   Pulse 88   Temp 98.3  F (36.8  C) (Oral)   Resp 20   Ht 1.651 m (5' 5\")   Wt 84.8 kg (187 lb)   LMP 10/12/2022   Breastfeeding No   BMI 31.12 kg/m    SVE: 8.5 80% +1, bulging bag of fluid   FHTs   Cxt q 3, lasting 60-90   Membranes intact/bulging     Labor Course:  8/3/23    0800  Admit to L&D in active labor 6-7 cm  0940 8-9 cm    Assessment:   IUP @ 39w6d  Membranes  intact,   GBS positive, abx prophylaxis x one doses (0837)  Waiting for epidural     Plan:  Continue to labor  Continue to observe and offer comfort  Anticipate        Laura Goldsmith, HOA CHAVESM     "

## 2023-08-03 NOTE — PLAN OF CARE
Vaginal Delivery Note   of viable Female with ROCK Goldsmith CNM, JOSIAS Woodard RN in attendance.  GIANLUCA Cazares RN  present.  Infant with spontaneous cry, to mother's abdomen, dried and stimulated.  APGAR at 1 minute:  9   and APGAR at 5 minutes:  9.  Placenta delivered with out complication, Pitocin given IV, perineal  laceration, not repaired, raymon cares provided.  Mother and baby in stable condition.      Plan of Care Reviewed With: patient, spouse

## 2023-08-03 NOTE — PROGRESS NOTES
Data: Giselle Edwards transferred to 7123 via wheelchair at 12:56 PM. Baby transferred via parent's arms.  Action: Receiving unit notified of transfer: Yes. Patient and family notified of room change. Belongings sent to receiving unit. Accompanied by Registered Nurse. Oriented patient to surroundings. Call light within reach. ID bands double-checked with receiving RN.  Response: Patient tolerated transfer and is stable.

## 2023-08-03 NOTE — H&P
Giselle Edwards is a 23 year old female    Patient's last menstrual period was 10/12/2022., Estimated Date of Delivery: Aug 4, 2023 is calculated from early U/S alone (<14wks)     [facility] at 8:16 AM     in active labor.  Membranes are bulging    HPI: Pt was seen in clinic two days ago and had her membranes stripped.  Pt reports rigo irregularly since that time but last night at about 0230 the contractions woke her up and increased in intensity and frequency.  Pt states she is rigo about every 8-10 mins now    PRENATAL COURSE  Prenatal care began at 12w6d wks gestation for a total of 12 prenatal visits.  Total wt gain Pre gravid BMI 24.96, TWG 37 lbs   Prenatal course was complicated by    Patient Active Problem List    Diagnosis Date Noted    Normal labor 2023     Priority: Medium    Supervision of other normal pregnancy, antepartum 01/10/2023     Priority: Medium     [facility] CNM  FOB [place]  Close spaced pregnancies, still breastfeeding   Declines genetic testing       Need for Tdap vaccination 2023     Priority: Medium    Sciatica of right side 2021     Priority: Medium    Migraine with aura 2019     Priority: Medium    Branch retinal artery occlusion 2019     Priority: Medium     Formatting of this note might be different from the original.  Had prolonged vision changes with no migraine. 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 2019      Irregular menstrual cycle 2016     Priority: Medium    Eczema 2010     Priority: Medium    Constipation 2005     Priority: Medium     Problem list name updated by automated process. Provider to review         HISTORIES  Allergies   Allergen Reactions    Amoxicillin Rash     Childhood rash     Past Medical History:   Diagnosis Date    Carrier of group B Streptococcus     Irregular menstrual cycle     Migraine with aura     Sciatica of right side     Simple or  "unspecified chronic serous otitis media     Unspecified constipation     Varicose veins of lower extremity      Past Surgical History:   Procedure Laterality Date    ZZHC CREATE EARDRUM OPENING,GEN ANESTH      P.E. Tubes     Family History   Problem Relation Age of Onset    Hypertension Mother     Diabetes Cousin     Depression Other     C.A.D. No family hx of     Allergies No family hx of     Lipids No family hx of     Respiratory No family hx of         asthma     Social History     Tobacco Use    Smoking status: Never    Smokeless tobacco: Never   Substance Use Topics    Alcohol use: No     OB History    Para Term  AB Living   2 1 1 0 0 1   SAB IAB Ectopic Multiple Live Births   0 0 0 0 1      # Outcome Date GA Lbr Addison/2nd Weight Sex Delivery Anes PTL Lv   2 Current            1 Term 22 42w0d  3.657 kg (8 lb 1 oz) F  None  CJ       LABS:    Blood type A pos  Rhogam not indicated  Rubella immune   Treponema Pallidum Antibody  Negative    HIV    Non-reactive   Hepatitis B Non-reactive   GBS positive, clinda susceptible     ROS  Pt denies significant constitutional symptoms including fever and/or malaise.  Pt denies significant respiratory, cardiovacular, GI, or muscular/skeletal complaints.      PHYSICAL EXAM:  /61 (BP Location: Left arm, Patient Position: Semi-Leon's, Cuff Size: Adult Regular)   Pulse 88   Temp 98.3  F (36.8  C) (Oral)   Resp 20   Ht 1.651 m (5' 5\")   Wt 84.8 kg (187 lb)   LMP 10/12/2022   Breastfeeding No   BMI 31.12 kg/m    General appearance healthy, alert, active, and mild distress   Neuro:  denies headache and visual disturbances  Psych: Mentation normal and bright   Legs: 2+/2+, no clonus, no edema       Abdomen: gravid, single vertex fetus, non-tender, EFW 7 lbs 10 oz . Pt is rigo every 5-8 minutes, lasting 90 seconds and palpates strong      FETAL HEART TONES: baseline 150 with moderate FHRV variability and accelerations.  No decelerations " present.     PELVIC EXAM: 6.5/ 80%/ +1  BLOODY SHOW:: no  Membranes as listed above    ASSESSMENT:  IUP @ 39w6d  wks gestation  in transition labor  NST  reactive   Parity: Multip  GBS positive and membranes intact  Desires epidural     PLAN:  Admit - see IP orders  Start abx for GBS prophylaxis, Amoxicillin allergy, GBS is clindamycin susceptible so will use that   Prepare for epidural        Laura Goldsmith CNM

## 2023-08-04 LAB — HGB BLD-MCNC: 10.1 G/DL (ref 11.7–15.7)

## 2023-08-04 PROCEDURE — 250N000013 HC RX MED GY IP 250 OP 250 PS 637: Performed by: ADVANCED PRACTICE MIDWIFE

## 2023-08-04 PROCEDURE — 85018 HEMOGLOBIN: CPT | Performed by: ADVANCED PRACTICE MIDWIFE

## 2023-08-04 PROCEDURE — 36415 COLL VENOUS BLD VENIPUNCTURE: CPT | Performed by: ADVANCED PRACTICE MIDWIFE

## 2023-08-04 PROCEDURE — 120N000002 HC R&B MED SURG/OB UMMC

## 2023-08-04 RX ORDER — AMOXICILLIN 250 MG
1 CAPSULE ORAL DAILY
Qty: 100 TABLET | Refills: 0 | COMMUNITY
Start: 2023-08-04 | End: 2024-04-09

## 2023-08-04 RX ORDER — IBUPROFEN 600 MG/1
600 TABLET, FILM COATED ORAL EVERY 6 HOURS PRN
Qty: 60 TABLET | Refills: 0 | COMMUNITY
Start: 2023-08-04 | End: 2024-04-09

## 2023-08-04 RX ORDER — ACETAMINOPHEN 325 MG/1
650 TABLET ORAL EVERY 6 HOURS PRN
Qty: 100 TABLET | Refills: 0 | COMMUNITY
Start: 2023-08-04

## 2023-08-04 RX ADMIN — IBUPROFEN 800 MG: 800 TABLET ORAL at 04:30

## 2023-08-04 RX ADMIN — ACETAMINOPHEN 650 MG: 325 TABLET, FILM COATED ORAL at 00:53

## 2023-08-04 RX ADMIN — IBUPROFEN 800 MG: 800 TABLET ORAL at 12:11

## 2023-08-04 RX ADMIN — ACETAMINOPHEN 650 MG: 325 TABLET, FILM COATED ORAL at 18:59

## 2023-08-04 RX ADMIN — ACETAMINOPHEN 650 MG: 325 TABLET, FILM COATED ORAL at 09:35

## 2023-08-04 RX ADMIN — ACETAMINOPHEN 650 MG: 325 TABLET, FILM COATED ORAL at 04:30

## 2023-08-04 RX ADMIN — IBUPROFEN 800 MG: 800 TABLET ORAL at 18:59

## 2023-08-04 RX ADMIN — ACETAMINOPHEN 650 MG: 325 TABLET, FILM COATED ORAL at 14:38

## 2023-08-04 ASSESSMENT — ACTIVITIES OF DAILY LIVING (ADL)
ADLS_ACUITY_SCORE: 20

## 2023-08-04 NOTE — PLAN OF CARE
"Goal Outcome Evaluation - 1900-0730:      Plan of Care Reviewed With: patient    Overall Patient Progress: improvingOverall Patient Progress: improving    VSS- pain controlled with IBU and tylenol. Doing tucks for laceration pain. PP assessment WDL - U2 firm and midline. Pt is ambulating independently and tolerating regular diet without n/v. Voiding and passing gas spontaneously.  is at bedside and attentive pt needs and baby cares.    Continue with education as needed and care plan.    Problem: Plan of Care - These are the overarching goals to be used throughout the patient stay.    Goal: Plan of Care Review  Description: The Plan of Care Review/Shift note should be completed every shift.  The Outcome Evaluation is a brief statement about your assessment that the patient is improving, declining, or no change.  This information will be displayed automatically on your shift note.  Outcome: Progressing  Flowsheets (Taken 8/3/2023 2121)  Plan of Care Reviewed With: patient  Overall Patient Progress: improving  Goal: Patient-Specific Goal (Individualized)  Description: You can add care plan individualizations to a care plan. Examples of Individualization might be:  \"Parent requests to be called daily at 9am for status\", \"I have a hard time hearing out of my right ear\", or \"Do not touch me to wake me up as it startles me\".  Outcome: Progressing  Goal: Absence of Hospital-Acquired Illness or Injury  Outcome: Progressing  Intervention: Prevent Skin Injury  Recent Flowsheet Documentation  Taken 8/3/2023 2054 by Lori Cabezas RN  Body Position: position changed independently  Goal: Optimal Comfort and Wellbeing  Outcome: Progressing  Intervention: Monitor Pain and Promote Comfort  Recent Flowsheet Documentation  Taken 8/3/2023 2053 by Lori Cabezas RN  Pain Management Interventions: medication (see MAR)  Intervention: Provide Person-Centered Care  Recent Flowsheet Documentation  Taken 8/3/2023 2054 by Lori Cabezas" RN  Trust Relationship/Rapport:   care explained   choices provided  Goal: Readiness for Transition of Care  Outcome: Progressing

## 2023-08-04 NOTE — PROGRESS NOTES
"Post Partum Note    Giselle Edwards      MRN#: [id number]  Age: 23 year old      YOB: 2000      Post-partum day #1    SIGNIFICANT PROBLEMS:  Patient Active Problem List    Diagnosis Date Noted    Normal labor 2023     Priority: Medium    Supervision of other normal pregnancy, antepartum 01/10/2023     Priority: Medium     [facility] CNM  FOB [place]  Close spaced pregnancies, still breastfeeding   Declines genetic testing       Need for Tdap vaccination 2023     Priority: Medium    Sciatica of right side 2021     Priority: Medium    Migraine with aura 2019     Priority: Medium    Branch retinal artery occlusion 2019     Priority: Medium     Formatting of this note might be different from the original.  Had prolonged vision changes with no migraine. 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 2019      Irregular menstrual cycle 2016     Priority: Medium    Eczema 2010     Priority: Medium    Constipation 2005     Priority: Medium     Problem list name updated by automated process. Provider to review         INTERVAL HISTORY:  /73 (BP Location: Left arm, Patient Position: Semi-Leon's, Cuff Size: Adult Regular)   Pulse 73   Temp 97.8  F (36.6  C) (Oral)   Resp 16   Ht 1.651 m (5' 5\")   Wt 84.8 kg (187 lb)   LMP 10/12/2022   Breastfeeding Unknown   BMI 31.12 kg/m      Pt stable, baby is rooming in  Breast feeding status:initiated  Complications since 2 hours post delivery: None  Patient is tolerating acitivity well Voiding without difficulty, cramping is minimal and is relieved by Ibuprophen, lochia is decreasing and patient denies clots.  Perineal pain is is minimal and is relieved by Ibuprophen.  The perineum is intact    Normal postpartum exam     Postpartum hemoglobin   Hemoglobin   Date Value Ref Range Status   2023 10.1 (L) 11.7 - 15.7 g/dL Final   2011 14.0 11.7 - 15.7 g/dL Final     Blood type A POS  Rubella " immune    ASSESSMENT/PLAN:  Stable Post-partum day #1  Complications:none  Plan d/c home tomorrow  RTC 6 weeks  Teaching done: D/C Instructions: Nutrition/Activity, Birth Control Options, Warning Signs/When to Call: Excessive Bleeding, Infection, PP Depression, and RTC Clinic for PP Appointment    Postpartum warning s/s reviewed, including bleeding/clots, fever, mastitis, or depression    Birthcontrol planned:Condoms  Current Discharge Medication List        CONTINUE these medications which have NOT CHANGED    Details   omeprazole (PRILOSEC OTC) 20 MG EC tablet Take 20 mg by mouth daily      Prenatal Vit-Ebony-Fe Fum-FA (VINATE M) 27-1 MG TABS Take 1 tablet by mouth daily      SUMAtriptan (IMITREX) 25 MG tablet Take 25 mg by mouth          Becki Linda CNM

## 2023-08-05 VITALS
HEART RATE: 85 BPM | DIASTOLIC BLOOD PRESSURE: 73 MMHG | BODY MASS INDEX: 29.5 KG/M2 | WEIGHT: 177.06 LBS | HEIGHT: 65 IN | SYSTOLIC BLOOD PRESSURE: 112 MMHG | RESPIRATION RATE: 16 BRPM | TEMPERATURE: 97.6 F

## 2023-08-05 PROCEDURE — 250N000013 HC RX MED GY IP 250 OP 250 PS 637: Performed by: ADVANCED PRACTICE MIDWIFE

## 2023-08-05 RX ADMIN — IBUPROFEN 800 MG: 800 TABLET ORAL at 08:17

## 2023-08-05 RX ADMIN — ACETAMINOPHEN 650 MG: 325 TABLET, FILM COATED ORAL at 00:51

## 2023-08-05 RX ADMIN — IBUPROFEN 800 MG: 800 TABLET ORAL at 00:51

## 2023-08-05 RX ADMIN — ACETAMINOPHEN 650 MG: 325 TABLET, FILM COATED ORAL at 08:17

## 2023-08-05 ASSESSMENT — ACTIVITIES OF DAILY LIVING (ADL)
ADLS_ACUITY_SCORE: 20

## 2023-08-05 NOTE — PLAN OF CARE
Goal Outcome Evaluation:  VSS, postpartum assessment WDL.  Scant amount of lochia, fundus firm and below U.  Denies any H/A, vision changes or pre-e symptoms.  Voiding w/o difficulties, tolerating PO.  Breastfeeding well independently.  BC complete, EDS score 0, has breat pump at home.  Cramping and perineum discomfort managed with tylenol and Ibuprofen.   at bedside and supportive, both bonding well with infant.  Continue with education and POC.

## 2023-08-05 NOTE — PLAN OF CARE
Goal Outcome Evaluation: VSS, postpartum assessment WDL.  Scant amount of lochia, fundus firm and below U.  Denies any H/A, vision changes or pre-e symptoms.  Breastfeeding well independently, pt feels like her milk supply is coming in.  Pain is minimal, Ibuprofen and Tylenol administered for perineum discomfort and cramping.  Bonding well with infant,  at bedside and supportive.  Discharge home this morning.

## 2023-08-05 NOTE — DISCHARGE SUMMARY
NISHA NEWSOME Postpartum Progress Note:   2023  10:08 AM         SUBJECTIVE:   Patient complaining of No complaints after .  Pain controlled? Yes  Breastfeeding:  yes  Voiding without difficulty? Yes  Desired contraception? Condoms           OBJECTIVE:   B/P: 112/73, T: 97.6, P: 85, R: 16    Constitutional: healthy, alert, and no distress    Lungs:   No increased work of breathing, good air exchange, clear to auscultation bilaterally, no crackles or wheezing     Cardiovascular:   Normal apical impulse, regular rate and rhythm, normal S1 and S2, no S3 or S4, and no murmur noted     Chest / Breast:   Breasts symmetrical, skin without lesion(s), no nipple retraction or dimpling, no nipple discharge, no masses palpated, no axillary or supraclavicular adenopathy     Abdomen:   No scars, normal bowel sounds, soft, non-distended, non-tender, no masses palpated, no hepatosplenomegally     Uterine fundus is firm, non-tender and at the level of the umbilicus    Musculoskeletal:   There is no redness, warmth, or swelling of the joints.  Full range of motion noted.  Motor strength is 5 out of 5 all extremities bilaterally.  Tone is normal.            LABS:     Lab Results   Component Value Date    HGB 10.1 2023    HGB 11.2 2023    HGB 14.0 2011     A POS, No results found for: RH  No results found for: RUBELLAABIGG    Immunization History   Administered Date(s) Administered    COVID-19 Vaccine (Niels) 2021    DTAP (<7y) 1999, 1999, 1999, 2000, 2004    HEPA 2012, 2013    HEPATITIS A (PEDS 12M-18Y) 2012, 2013    HPV 2012, 2013, 10/17/2014    HPV Quadrivalent 2012, 2013, 10/17/2014    HepB 1999, 1999, 1999    Hepatitis B (Peds <19Y) 1999, 1999, 1999    Historic Hib Hib-titer 1999, 1999, 1999, 2000    Influenza (IIV3) PF 2003, 2007, 2009,  10/17/2014    Influenza Vaccine >6 months (Alfuria,Fluzone) 10/17/2014, 2015, 2017    Influenza Vaccine, 6+MO IM (QUADRIVALENT W/PRESERVATIVES) 10/11/2018, 10/15/2020    MMR 2000, 2004    Meningococcal ACWY (Menactra ) 2012, 2013, 2019    Meningococcal ACWY (Menveo ) 2012, 2013    Pneumococcal (PCV 7) 2000    Pneumococcal 23 valent 2000    Poliovirus, inactivated (IPV) 1999, 1999, 2000, 2004    TD,PF 7+ (Tenivac) 2021    TDAP (Adacel,Boostrix) 2010, 2022, 2023    TDAP Vaccine (Boostrix) 2010    Varicella 2000, 2009              ASSESSMENT:       PPD #2      Doing well.             PLAN:       d/c home today.  f/u in the office in 6 wks      Jameel Gray APRN, CNM

## 2023-08-05 NOTE — PLAN OF CARE
Goal Outcome Evaluation: All discharge education complete, AVS signed and reviewed- all questions answered.  Pt discharged unit around 1030.

## 2023-08-05 NOTE — PLAN OF CARE
Goal Outcome Evaluation:    Vital signs stable. Postpartum assessment WDL. Pain controlled with tylenol and ibuprofen. Patient voiding without difficulty. Breastfeeding on cue with no assist. Patient and infant bonding well. Anticipates discharge today. Will continue with current plan of care.        Plan of Care Reviewed With: patient  Overall Patient Progress: improving

## 2023-09-13 ENCOUNTER — PRENATAL OFFICE VISIT (OUTPATIENT)
Dept: MIDWIFE SERVICES | Facility: CLINIC | Age: 23
End: 2023-09-13
Payer: COMMERCIAL

## 2023-09-13 VITALS
HEART RATE: 73 BPM | WEIGHT: 170 LBS | TEMPERATURE: 98 F | SYSTOLIC BLOOD PRESSURE: 111 MMHG | BODY MASS INDEX: 28.29 KG/M2 | DIASTOLIC BLOOD PRESSURE: 65 MMHG

## 2023-09-13 PROCEDURE — 99207 PR POST PARTUM EXAM: CPT | Performed by: ADVANCED PRACTICE MIDWIFE

## 2023-09-13 NOTE — Clinical Note
Please abstract the following data from this visit with this patient into the appropriate field in Epic:  Tests that can be patient reported without a hard copy:  Pap smear done on this date: 07/13/2022 (approximately), by this group: [facility], results were NIL- per pt report.   Other Tests found in the patient's chart through Chart Review/Care Everywhere:    Note to Abstraction: If this section is blank, no results were found via Chart Review/Care Everywhere.

## 2023-09-13 NOTE — PROGRESS NOTES
SUBJECTIVE:   Giselle Edwards is here for her 6-week postpartum checkup.     HPI: Giselle reports recovery is going well, no concerns.     DELIVERY DATE: 08/3/2023   of a viable girl, weight 7 pounds 1 oz., with no complications.  FEEDING METHOD:    CONTRACEPTION PLANNED: condoms  She  has not had intercourse since delivery and complains of No discomfort.  HX OF DEPRESSION:No  HX OF ABUSE:No  OTHER HPI: She has No signs of infection, bleeding or other complications. Bleeding stopped, epis/lac healing well.         EXAM:  LMP 10/12/2022   General: well appearing, in NAD  Cardiac: well perfused  Respiratory: non-labored breathing on room air   Psych: alert and oriented     ASSESSMENT/PLAN:   (Z39.2) Routine postpartum follow-up  (primary encounter diagnosis)  Comment:   Plan:     PLAN:  Birth Control as ordered. Fertility reviewed.  Planning condoms. Will likely have another pregnancy and would like closely spaced age range - ideally would have next baby when this baby turns 1.  Return as needed or at time interval for next routine pap, pelvic, or breast exam. Had NIL pap in [place], [place] in 2022  RTC yearly for annual exam or with next pregnancy for prenatal care.   Declines flu shot today.   Becki Linda CNM

## 2024-04-09 ENCOUNTER — VIRTUAL VISIT (OUTPATIENT)
Dept: OBGYN | Facility: CLINIC | Age: 24
End: 2024-04-09
Payer: COMMERCIAL

## 2024-04-09 VITALS — HEIGHT: 65 IN | BODY MASS INDEX: 28.29 KG/M2

## 2024-04-09 DIAGNOSIS — Z34.90 ENCOUNTER FOR SUPERVISION OF NORMAL PREGNANCY: Primary | ICD-10-CM

## 2024-04-09 DIAGNOSIS — Z23 NEED FOR TDAP VACCINATION: ICD-10-CM

## 2024-04-09 PROBLEM — Z34.80 SUPERVISION OF OTHER NORMAL PREGNANCY, ANTEPARTUM: Status: RESOLVED | Noted: 2023-01-10 | Resolved: 2024-04-09

## 2024-04-09 PROBLEM — Z37.9 NORMAL LABOR: Status: RESOLVED | Noted: 2023-08-03 | Resolved: 2024-04-09

## 2024-04-09 NOTE — PROGRESS NOTES
Important Information for Provider:     New ob nurse intake by phone, third pregnancy. Recent pregnancy 8/3/2023.Handouts reviewed. Declined genetic screening. Ultrasound and NOB with CNM 4/26/2024      Caffeine intake/servings daily -   Calcium intake/servings daily - 3  Exercise 5 times weekly - describe ; walks, precautions given  Sunscreen used - Yes  Seatbelts used - Yes  Guns stored in the home - No  Self Breast Exam - Yes  Pap test up to date -  Yes  Dental exam up to date -  Yes  Immunizations reviewed and up to date - Yes  Abuse: Current or Past (Physical, Sexual or Emotional) - No  Do you feel safe in your environment - Yes  Do you cope well with stress - Yes    Prenatal OB Questionnaire  Patient supplied answers from flow sheet for:  Prenatal OB Questionnaire.  Past Medical History  Have you ever received care for your mental health? : (!) Yes  Have you ever been in a major accident or suffered serious trauma?: No  Within the last year, has anyone hit, slapped, kicked or otherwise hurt you?: No  In the last year, has anyone forced you to have sex when you didn't want to?: No    Past Medical History 2   Have you ever received a blood transfusion?: No  Would you accept a blood transfusion if was medically recommended?: Yes  Does anyone in your home smoke?: No   Is your blood type Rh negative?: No  Have you ever ?: (!) Yes  Have you been hospitalized for a nonsurgical reason excluding normal delivery?: No  Have you ever had an abnormal pap smear?: No    Past Medical History (Continued)  Do you have a history of abnormalities of the uterus?: No  Did your mother take BUTCH or any other hormones when she was pregnant with you?: No  Do you have any other problems we have not asked about which you feel may be important to this pregnancy?: No    Allergies as of 4/9/2024:    Allergies as of 04/09/2024 - Reviewed 04/09/2024   Allergen Reaction Noted    Amoxicillin Rash and Hives 08/24/2005       Early  ultrasound screening tool:    Does patient have irregular periods?  No  Did patient use hormonal birth control in the three months prior to positive urine pregnancy test? No  Is the patient breastfeeding?  No  Is the patient 10 weeks or greater at time of education visit?  No

## 2024-04-25 LAB
ABO/RH(D): NORMAL
ANTIBODY SCREEN: NEGATIVE
SPECIMEN EXPIRATION DATE: NORMAL

## 2024-04-26 ENCOUNTER — ANCILLARY PROCEDURE (OUTPATIENT)
Dept: ULTRASOUND IMAGING | Facility: CLINIC | Age: 24
End: 2024-04-26
Payer: COMMERCIAL

## 2024-04-26 ENCOUNTER — PRENATAL OFFICE VISIT (OUTPATIENT)
Dept: MIDWIFE SERVICES | Facility: CLINIC | Age: 24
End: 2024-04-26
Payer: COMMERCIAL

## 2024-04-26 VITALS
TEMPERATURE: 98.3 F | SYSTOLIC BLOOD PRESSURE: 104 MMHG | HEART RATE: 82 BPM | BODY MASS INDEX: 25.76 KG/M2 | OXYGEN SATURATION: 100 % | WEIGHT: 154.8 LBS | DIASTOLIC BLOOD PRESSURE: 67 MMHG

## 2024-04-26 DIAGNOSIS — Z34.81 ENCOUNTER FOR SUPERVISION OF OTHER NORMAL PREGNANCY IN FIRST TRIMESTER: ICD-10-CM

## 2024-04-26 DIAGNOSIS — Z34.90 ENCOUNTER FOR SUPERVISION OF NORMAL PREGNANCY: ICD-10-CM

## 2024-04-26 LAB
ALBUMIN UR-MCNC: NEGATIVE MG/DL
APPEARANCE UR: CLEAR
BILIRUB UR QL STRIP: NEGATIVE
COLOR UR AUTO: YELLOW
ERYTHROCYTE [DISTWIDTH] IN BLOOD BY AUTOMATED COUNT: 13.1 % (ref 10–15)
GLUCOSE UR STRIP-MCNC: NEGATIVE MG/DL
HCT VFR BLD AUTO: 40.2 % (ref 35–47)
HGB BLD-MCNC: 13.3 G/DL (ref 11.7–15.7)
HGB UR QL STRIP: NEGATIVE
HOLD SPECIMEN: NORMAL
KETONES UR STRIP-MCNC: NEGATIVE MG/DL
LEUKOCYTE ESTERASE UR QL STRIP: NEGATIVE
MCH RBC QN AUTO: 26.4 PG (ref 26.5–33)
MCHC RBC AUTO-ENTMCNC: 33.1 G/DL (ref 31.5–36.5)
MCV RBC AUTO: 80 FL (ref 78–100)
NITRATE UR QL: NEGATIVE
PH UR STRIP: 6 [PH] (ref 5–7)
PLATELET # BLD AUTO: 262 10E3/UL (ref 150–450)
RBC # BLD AUTO: 5.03 10E6/UL (ref 3.8–5.2)
RUBV IGG SERPL QL IA: 5.63 INDEX
RUBV IGG SERPL QL IA: POSITIVE
SP GR UR STRIP: 1.01 (ref 1–1.03)
T PALLIDUM AB SER QL: NONREACTIVE
UROBILINOGEN UR STRIP-ACNC: 0.2 E.U./DL
WBC # BLD AUTO: 6.5 10E3/UL (ref 4–11)

## 2024-04-26 PROCEDURE — 36415 COLL VENOUS BLD VENIPUNCTURE: CPT | Performed by: ADVANCED PRACTICE MIDWIFE

## 2024-04-26 PROCEDURE — 86762 RUBELLA ANTIBODY: CPT | Performed by: ADVANCED PRACTICE MIDWIFE

## 2024-04-26 PROCEDURE — 81003 URINALYSIS AUTO W/O SCOPE: CPT | Performed by: ADVANCED PRACTICE MIDWIFE

## 2024-04-26 PROCEDURE — 76817 TRANSVAGINAL US OBSTETRIC: CPT | Performed by: OBSTETRICS & GYNECOLOGY

## 2024-04-26 PROCEDURE — 87340 HEPATITIS B SURFACE AG IA: CPT | Performed by: ADVANCED PRACTICE MIDWIFE

## 2024-04-26 PROCEDURE — 86780 TREPONEMA PALLIDUM: CPT | Performed by: ADVANCED PRACTICE MIDWIFE

## 2024-04-26 PROCEDURE — 86901 BLOOD TYPING SEROLOGIC RH(D): CPT | Performed by: ADVANCED PRACTICE MIDWIFE

## 2024-04-26 PROCEDURE — 87389 HIV-1 AG W/HIV-1&-2 AB AG IA: CPT | Performed by: ADVANCED PRACTICE MIDWIFE

## 2024-04-26 PROCEDURE — 85027 COMPLETE CBC AUTOMATED: CPT | Performed by: ADVANCED PRACTICE MIDWIFE

## 2024-04-26 PROCEDURE — 87086 URINE CULTURE/COLONY COUNT: CPT | Performed by: ADVANCED PRACTICE MIDWIFE

## 2024-04-26 PROCEDURE — 86900 BLOOD TYPING SEROLOGIC ABO: CPT | Performed by: ADVANCED PRACTICE MIDWIFE

## 2024-04-26 PROCEDURE — 86850 RBC ANTIBODY SCREEN: CPT | Performed by: ADVANCED PRACTICE MIDWIFE

## 2024-04-26 PROCEDURE — 99213 OFFICE O/P EST LOW 20 MIN: CPT | Performed by: ADVANCED PRACTICE MIDWIFE

## 2024-04-26 PROCEDURE — 86803 HEPATITIS C AB TEST: CPT | Performed by: ADVANCED PRACTICE MIDWIFE

## 2024-04-26 NOTE — PROGRESS NOTES
"7w4d   Giselle Edwards is a 24 year old  who presents to the clinic for an new ob visit.  She is a previous CNM patient, had babies with our team in  and .  Estimated Date of Delivery: Dec 9, 2024 is calculated from US done this morning, not consistent with LMP dating. Patient's last menstrual period was 2024. Giselle is still doing some breastfeeding, and has only had 2 cycles since her last pregnancy. History of normal, healthy pregnancies. No diabetes or HTN. Only have preliminary US report, CRISTY changed based on today's US. Preliminary report notes possible intrauterine polyp noted behind gestational sac.    Giselle's only concern today is that she has a history of a \"stroke\" behind her right eye that caused a blind spot in her vision. She has been noticing that spot more since she became pregnant this time, and has noted more blurry spots/floaters in that eye as well. She saw ophthalmology in [place] when the incident occurred and does not have a regular ophthalmologist here. Last eye exam was a little over a year ago.     She has not had bleeding since her LMP.   She has had moderate nausea. Weigh loss has not occurred.   This was a planned pregnancy.   FOB is involved,  [place]   OTHER CONCERNS: none    INFECTION HISTORY  HIV: no  Hepatitis B: no  Hepatitis C: no  Syphilis:  no  Tuberculosis: no   PPD- no  Herpes self: no  Herpes partner:  no  Chlamydia:  no  Gonorrhea:  no  HPV: no  BV:  no  Trichomonis:  no  Chicken Pox:   vaccinated  ====================================================  GENETIC SCREENING  Genetic screening reviewed. High Risk? none  ====================================================  PERSONAL/SOCIAL HISTORY  Lives lives with their family.  Employment: Part time.  Her job involves moderate activity .  HX OF ABUSE: unknown  =====================================================   REVIEW OF SYSTEMS  CONSTITUTIONAL: NEGATIVE for fever, chills  EYES: history of \"stroke in " "back of right eye\" and has ongoing \"blind spot\". Has noticed that more since becoming pregnant. Also having some increased spots in vision. Last eye exam 1 yr ago.  RESP: NEGATIVE for significant cough or SOB  BREAST: NEGATIVE for masses, tenderness or discharge  CV: NEGATIVE for chest pain, palpitations   GI: constipation and nausea  : NEGATIVE for frequency, dysuria, or hematuria  MUSCULOSKELETAL: NEGATIVE for significant arthralgias or myalgia  NEURO: NEGATIVE for weakness, dizziness or paresthesias or headache  HEME: NEGATIVE for bleeding problems  PSYCHIATRIC: NEGATIVE for changes in mood or affect  ====================================================    PHYSICAL EXAM:  /67   Pulse 82   Temp 98.3  F (36.8  C)   Wt 70.2 kg (154 lb 12.8 oz)   LMP 2024   SpO2 100%   Breastfeeding No   BMI 25.76 kg/m    BMI- Body mass index is 25.76 kg/m .,     RECOMMENDED WEIGHT GAIN: 25-35 lbs.  PHQ9- Today's Depression Rating was No Value exists for the : HP#PHQ9  GENERAL:  Pleasant pregnant female, alert, well groomed.   SKIN:  Warm and dry, without lesions or rashes  HEAD: Symmetrical features.  NECK:  Thyroid without enlargement and nodules.  Lymph nodes not palpable.   LUNGS:  Clear to auscultation.   HEART:  RRR without murmur.  ABDOMEN: Soft without masses , tenderness or organomegaly.  No CVA tenderness. FHT seen on US  MUSCULOSKELETAL:  Full range of motion  EXTREMITIES:  No edema. No significant varicosities.   =========================================  ASSESSMENT:  7w4d,   (Z34.81) Encounter for supervision of other normal pregnancy in first trimester    PREGNANCY AT RISK? no  ==========================================  PLAN:  -Instructed on use of triage nurse line and contacting the on call CNM after hours for an urgent need such as fever, vagina bleeding, bladder or vaginal infection, rupture of membranes,  or term labor.    -Discussed the indications, uses for and false " positives for quad screen, nuchal translucency and fetal survey ultrasound at 18-20 weeks gestation. Decline genetic screening  -Instructed on best evidence for: weight gain for her BMI for pregnancy; healthy diet and foods to avoid; exercise and activity during pregnancy;avoiding exposure to toxoplasmosis; and maintenance of a generally healthy lifestyle. Has all of these materials at home from previous pregnancy  -Discussed the harms, benefits, side effects and alternative therapies for current prescribed and OTC medications. Has handout at home from previous pregnancy.  -Encouraged her to go to the eye doctor to have her eyes evaluated. Offered ophthalmology referral, she declines for now.  -Follow-up in 1 month for next prenatal visit.  -Waiting for final US report for recommendations of mgmt for possible uterine polyp behind gestational sac.    Cb Barrow CNM

## 2024-04-27 LAB
BACTERIA UR CULT: NO GROWTH
HBV SURFACE AG SERPL QL IA: NONREACTIVE
HCV AB SERPL QL IA: NONREACTIVE
HIV 1+2 AB+HIV1 P24 AG SERPL QL IA: NONREACTIVE

## 2024-05-28 ENCOUNTER — PRENATAL OFFICE VISIT (OUTPATIENT)
Dept: MIDWIFE SERVICES | Facility: CLINIC | Age: 24
End: 2024-05-28
Payer: COMMERCIAL

## 2024-05-28 VITALS
DIASTOLIC BLOOD PRESSURE: 71 MMHG | BODY MASS INDEX: 26.76 KG/M2 | HEART RATE: 95 BPM | OXYGEN SATURATION: 100 % | SYSTOLIC BLOOD PRESSURE: 115 MMHG | WEIGHT: 160.8 LBS

## 2024-05-28 DIAGNOSIS — Z34.81 ENCOUNTER FOR SUPERVISION OF OTHER NORMAL PREGNANCY IN FIRST TRIMESTER: Primary | ICD-10-CM

## 2024-05-28 PROCEDURE — 99207 PR PRENATAL VISIT: CPT | Performed by: ADVANCED PRACTICE MIDWIFE

## 2024-05-28 NOTE — PROGRESS NOTES
12w1d  Giselle is feeling well today. Experiencing some headaches in the morning that are mild. They typically resolve when eating breakfast. Discussed Tylenol and hydration. Not planning genetic testing. Discussed history of retinal arterial occlusion. This happened prior to other pregnancies. She incorrectly described a history of stroke at NOB appointment. Clarified that she sought care with ophthalmology and diagnosis was GARCIA. She declines internal ophthalmology referral, plans to see her own ophthalmologist this pregnancy. RTC in 4 weeks.    HOA Crawley CNM

## 2024-06-23 ENCOUNTER — HEALTH MAINTENANCE LETTER (OUTPATIENT)
Age: 24
End: 2024-06-23

## 2024-06-24 ENCOUNTER — TELEPHONE (OUTPATIENT)
Dept: MIDWIFE SERVICES | Facility: CLINIC | Age: 24
End: 2024-06-24

## 2024-06-24 ENCOUNTER — PRENATAL OFFICE VISIT (OUTPATIENT)
Dept: MIDWIFE SERVICES | Facility: CLINIC | Age: 24
End: 2024-06-24
Payer: COMMERCIAL

## 2024-06-24 VITALS
OXYGEN SATURATION: 98 % | BODY MASS INDEX: 27.46 KG/M2 | DIASTOLIC BLOOD PRESSURE: 69 MMHG | WEIGHT: 165 LBS | SYSTOLIC BLOOD PRESSURE: 115 MMHG | HEART RATE: 90 BPM

## 2024-06-24 DIAGNOSIS — Z34.82 ENCOUNTER FOR SUPERVISION OF OTHER NORMAL PREGNANCY IN SECOND TRIMESTER: Primary | ICD-10-CM

## 2024-06-24 PROCEDURE — 99207 PR PRENATAL VISIT: CPT | Performed by: ADVANCED PRACTICE MIDWIFE

## 2024-06-24 NOTE — PROGRESS NOTES
16w0d  Patient is feeling well. Denies any vaginal bleeding, water leaking, abdominal pain, or other concerns. Not feeling movement, early yet. Declines genetic testing. Doing well and feeling good, more energy and less nausea. She weaned her younger daughter from breastfeeding, supply decreased with the pregnancy, going well. They moved in with her parents until [place] is done with school in 2 years. Really enjoying having them around and the girls love being with their grandparents.   Danger signs discussed. Patient knows when to call triage and has numbers to call.   Follow up in 4 weeks.   Zulma Horne CNM

## 2024-07-26 ENCOUNTER — ANCILLARY PROCEDURE (OUTPATIENT)
Dept: ULTRASOUND IMAGING | Facility: CLINIC | Age: 24
End: 2024-07-26
Attending: ADVANCED PRACTICE MIDWIFE
Payer: COMMERCIAL

## 2024-07-26 ENCOUNTER — TRANSCRIBE ORDERS (OUTPATIENT)
Dept: MATERNAL FETAL MEDICINE | Facility: CLINIC | Age: 24
End: 2024-07-26

## 2024-07-26 ENCOUNTER — PRENATAL OFFICE VISIT (OUTPATIENT)
Dept: MIDWIFE SERVICES | Facility: CLINIC | Age: 24
End: 2024-07-26
Attending: ADVANCED PRACTICE MIDWIFE
Payer: COMMERCIAL

## 2024-07-26 VITALS — SYSTOLIC BLOOD PRESSURE: 108 MMHG | DIASTOLIC BLOOD PRESSURE: 67 MMHG

## 2024-07-26 DIAGNOSIS — Z34.82 ENCOUNTER FOR SUPERVISION OF OTHER NORMAL PREGNANCY IN SECOND TRIMESTER: ICD-10-CM

## 2024-07-26 DIAGNOSIS — O28.3 ECHOGENIC INTRACARDIAC FOCUS OF FETUS ON PRENATAL ULTRASOUND: Primary | ICD-10-CM

## 2024-07-26 DIAGNOSIS — O26.90 PREGNANCY RELATED CONDITION, ANTEPARTUM: Primary | ICD-10-CM

## 2024-07-26 PROCEDURE — 99207 PR PRENATAL VISIT: CPT | Performed by: ADVANCED PRACTICE MIDWIFE

## 2024-07-26 PROCEDURE — 76805 OB US >/= 14 WKS SNGL FETUS: CPT | Performed by: OBSTETRICS & GYNECOLOGY

## 2024-07-26 NOTE — PROGRESS NOTES
20w4d  Giselle is here with [place] today. Reports good fetal movement. Denies leaking of fluid, vaginal bleeding, regular uterine contractions, headache, visual changes, or other concerns. She had anatomy ultrasound prior to appointment. Preliminary US results show EIF. Discussed EIF and recommendation for level II US. MFM referral placed. Discussed upcoming GCT in 4 weeks.    HOA CrawleyM

## 2024-08-06 ENCOUNTER — PRE VISIT (OUTPATIENT)
Dept: MATERNAL FETAL MEDICINE | Facility: CLINIC | Age: 24
End: 2024-08-06
Payer: COMMERCIAL

## 2024-08-14 ENCOUNTER — HOSPITAL ENCOUNTER (OUTPATIENT)
Dept: ULTRASOUND IMAGING | Facility: CLINIC | Age: 24
Discharge: HOME | End: 2024-08-14
Attending: OBSTETRICS & GYNECOLOGY
Payer: COMMERCIAL

## 2024-08-14 ENCOUNTER — OFFICE VISIT (OUTPATIENT)
Dept: MATERNAL FETAL MEDICINE | Facility: CLINIC | Age: 24
End: 2024-08-14
Attending: OBSTETRICS & GYNECOLOGY
Payer: COMMERCIAL

## 2024-08-14 DIAGNOSIS — O99.212 OBESITY AFFECTING PREGNANCY IN SECOND TRIMESTER, UNSPECIFIED OBESITY TYPE: Primary | ICD-10-CM

## 2024-08-14 DIAGNOSIS — O28.3 ECHOGENIC INTRACARDIAC FOCUS OF FETUS ON PRENATAL ULTRASOUND: Primary | ICD-10-CM

## 2024-08-14 DIAGNOSIS — O26.90 PREGNANCY RELATED CONDITION, ANTEPARTUM: ICD-10-CM

## 2024-08-14 PROCEDURE — 99203 OFFICE O/P NEW LOW 30 MIN: CPT | Mod: 25 | Performed by: STUDENT IN AN ORGANIZED HEALTH CARE EDUCATION/TRAINING PROGRAM

## 2024-08-14 PROCEDURE — 96040 HC GENETIC COUNSELING, EACH 30 MINUTES: CPT

## 2024-08-14 PROCEDURE — 76811 OB US DETAILED SNGL FETUS: CPT | Mod: 26 | Performed by: STUDENT IN AN ORGANIZED HEALTH CARE EDUCATION/TRAINING PROGRAM

## 2024-08-14 PROCEDURE — 76811 OB US DETAILED SNGL FETUS: CPT

## 2024-08-14 NOTE — PROGRESS NOTES
[facility]  Genetic Counseling Consult    Patient:  Giselle Edwards  Preferred Name: Giselle YOB: 2000   Date of Service:  24   MRN: [id number]    Giselle was seen at the [facility] for genetic consultation. The indication for genetic counseling is abnormal fetal ultrasound at outside clinic which showed an echogenic intracardiac focus (EIF). The patient was accompanied to this visit by their partner, Bolivar. Giselle's genetic counseling consultation took place prior to ultrasound with [facility], therefore documentation in this note is reflective of information and ultrasound findings provided by the referring OB provider.    The session was conducted in English.      IMPRESSION/ PLAN   1. Giselle has not had genetic screening in this pregnancy and declines options discussed today.    2. During today's MFM visit, Giselle had a genetic counseling session only. Screening and diagnostic testing was discussed and declined.     3. Giselle had a level II comprehensive anatomy ultrasound today. Please see the ultrasound report for further details.    PREGNANCY HISTORY   /Parity:       Giselle's pregnancy history is significant for:   : Pregnancy carried to term with her healthy 2 year old daughter, Yoanna  : Pregnancy carried to term with her healthy 1 year old daughter, Glenna    CURRENT PREGNANCY   Current Age: 24 year old   Age at Delivery: 24 year old  CRISTY: 2024, by Ultrasound                                   Gestational Age: 23w2d  This pregnancy is a single gestation.   This pregnancy was conceived spontaneously.    MEDICAL HISTORY   Giselle has a personal history of a branch retinal arterial occlusion and last saw ophthalmology in 2019, however, this history is not expected to impact pregnancy management or risks to fetal development.       FAMILY HISTORY   A three-generation pedigree was obtained  "today and is scanned under the \"Media\" tab in Epic. The family history was reported by Giselle and their partner.    The following significant findings were reported today:   Giselle's partner, Bolivar, is currently 25 years old and healthy    Bolivar reports that he has two paternal aunt, both diagnosed with breast cancer in their 40s. Of note, his paternal grandfather was diagnosed with liver cancer and colorectal cancer in his 70s-80s. We briefly discussed the family history of cancer. Cancer most often occurs by chance, however some families seem to develop cancer more frequently than expected. Everyone has a risk to develop cancer, but individuals may be at an increased risk to develop cancer based on their family history. We discussed that early onset breast cancer can be associated with inherited cancer predisposition syndromes. Genetic counseling is available for cancer syndromes. Cancer family history, even without genetic testing, can change cancer screening recommendations for family members and aid in insurance coverage for access to them as well. The most informative individuals to complete cancer genetic counseling and genetic testing are those with a personal history of cancer or those closely related to the affected individuals. We reviewed that if the family wants more information they can contact the [facility] Cancer Risk Management Program ([phone]). Physicians can also make referrals at [email or web address] or, if within the [employer] system, through [facility] referral for \"Cancer Risk Mgmt/Cancer Genetic Counseling\". A patient handout of the [facility] Cancer Risk Management Program was provided to Bolivar.    Otherwise, the reported family history is unremarkable for multiple miscarriages, stillbirths, birth defects, intellectual disabilities, known genetic conditions, and consanguinity.       RISK ASSESSMENT FOR INHERITED CONDITIONS AND " CARRIER SCREENING OPTIONS   Expanded carrier screening is available to screen for autosomal recessive conditions and X-linked conditions in a large list of genes. Carrier screening does not test the pregnancy but gives a risk assessment for the pregnancy and future pregnancies to have the condition. Expanded carrier screening is designed to identify carrier status for conditions that are primarily childhood or adolescent onset. Expanded carrier screening does not evaluate for adult-onset conditions such as hereditary cancer syndromes, dementia/ Alzheimer's disease, or cardiovascular disease risk factors. Additionally, expanded carrier screening is not comprehensive for all known genetic diseases or inherited conditions. Carrier screening does not test for all genetic and health conditions or risk factors.     Autosomal recessive conditions happen when a mutation has been inherited from the egg and sperm and include conditions like cystic fibrosis, thalassemia, hearing loss, spinal muscular atrophy, and more. We reviewed that when both biological parents carry a harmful genetic change in a gene associated with autosomal recessive inheritance, each of their pregnancies has a 1 in 4 (25%) chance to be affected by that condition. X-linked conditions happen when a mutation has been inherited from the egg and include conditions like fragile X syndrome.With x-linked conditions, the specific risk generally depends on the chromosomal sex of the fetus, with XY individuals (generally male) being most severely affected.     Esparto screening was reviewed. About [place] Esparto Screening    The patient does NOT have a family history of known inherited conditions. This does NOT mean the patient and/or their partner is not a carrier of a condition. Approximately 90% of couples at an increased reproductive risk for an inherited condition have no family history of that condition.     The patient has not had carrier screening previously.      The patient declined the carrier screening options. They are aware the option will remain, and they can contact us if they would like to pursue screening. See below for the more detailed information we dicussed.  Carrier screening does not test the pregnancy but gives a risk assessment for the pregnancy and future pregnancies to have the condition  There are different size panels or list of conditions for carrier screening.  Some conditions cause health problems for carriers. We discussed that in the event of an incidental finding, further evaluation regarding screening or further testing may be recommended.   Carrier screening does not test for all genetic and health conditions or risk factors    RISK ASSESSMENT FOR CHROMOSOME CONDITIONS   We explained that the risk for fetal chromosome abnormalities increases with maternal age. We discussed specific features of common chromosome abnormalities, including trisomy 21 (Down syndrome), trisomy 13, trisomy 18, and sex chromosome trisomies.    At age 24 at midtrimester, the risk to have a baby with Down syndrome is 1 in 1040.  At age 24 at midtrimester, the risk to have a baby with any chromosome abnormality is 1 in 520.   At age 24 at delivery, the risk to have a baby with Down syndrome is 1 in 1250.   At age 24 at delivery, the risk to have a baby with any chromosome abnormality is 1 in 476.    Giselle has not had genetic screening in this pregnancy and declines options discussed today.     Echogenic Intracardiac Focus (EIF)  Giselle's genetic counseling consultation took place prior to ultrasound with [facility], therefore documentation in this note is reflective of information and ultrasound findings provided by the referring OB provider.     We discussed common aneuploidy markers identified on level II ultrasounds. Aneuploidy means an abnormal number of chromosomes. These markers are used to adjust age-related risk for chromosome abnormalities. While markers are often  seen in babies without a chromosome condition, they are seen slightly more often in babies with a condition. Therefore, each marker is associated with a different increase in risk but alone not diagnose a condition, such a Down syndrome.     Giselle's level II ultrasound at 20w4d with her primary OB provider identified an echogenic intracardiac focus or EIF, which can be described as a small bright spot in the heart.  We discussed this specific finding is not a birth defect and does not cause concerns on its own. An EIF is not typically associated with a heart defect or any heart problems. This finding can be more common (up to 30%), and therefore, less concerning, if one or both of the parents have  ethnicity.     An EIF can be seen in 3-5% fetus without Down syndrome. However, in the second trimester ultrasound about 21-28% of fetuses with Down syndrome have an EIF.     We discussed the following risk adjustment for a fetus with Down Syndrome, midtrimester:    Age-related aneuploidy risk     1 in 1040 (0.10%)   Likelihood ratio    1.4-1.8      Modified aneuploidy risk   1 in 743 to 1 in 577        (0.13-0.17%)       We discussed the options for more information including a cell-free DNA non-invasive screen (NIPT) or a diagnostic option such as an amniocentesis. We discussed that a screen would provide a more accurate risk assessment for this pregnancy by analyzing the cell-free DNA from the placenta in maternal blood. However, only an amniocentesis can provide diagnostic information by directly analyzing the chromosomes from fetal cells in the amniotic fluid. Non-invasive prenatal screening and diagnostic testing was declined at this time, however, Giselle is byrd these options remain available to her.     GENETIC TESTING OPTIONS   Genetic testing during a pregnancy includes screening and diagnostic procedures.      Screening tests are non-invasive which means no risk to the pregnancy and includes ultrasounds  and blood work. The benefits and limitations of screening were reviewed. Screening tests provide a risk assessment (chance) specific to the pregnancy for certain fetal chromosome abnormalities but cannot definitively diagnose or exclude a fetal chromosome abnormality. Follow-up genetic counseling and consideration of diagnostic testing is recommended with any abnormal screening result. Diagnostic testing during a pregnancy is more certain and can test for more conditions. However, the tests do have a risk of miscarriage that requires careful consideration. These tests can detect fetal chromosome abnormalities with greater than 99% certainty. Results can be compromised by maternal cell contamination or mosaicism and are limited by the resolution of current genetic testing technology.     There is no screening or diagnostic test that detects all forms of birth defects or intellectual disability.     We discussed the following screening options:     Non-invasive prenatal testing (NIPT)  Also called cell-free DNA screening because it detects chromosomes from the placenta in the pregnant person's blood  Can be done any time after 10 weeks gestation  Standard recommendation for NIPT screens for trisomy 21, trisomy 18, trisomy 13, with the option of adding sex chromosome aneuploidies, without or without predicted sex  Cannot screen for open neural tube defects, maternal serum AFP after 15 weeks is recommended  New NIPT options include screening for other trisomies, microdeletion syndromes, and in some cases fetal blood antigens. Guidelines do not recommend these conditions are included in standard screening. These options have limitations and should be discussed with a genetic counselor.   However, current (2023) ACMG guidelines do recommend that screening for one microdeletion syndrome, called 22q11.2 deletion syndrome be offered to all pregnant patients. 22q11.2 deletion syndrome has an estimated prevalence of 1 in 990  to 1 in 2148 (0.05-0.1%). Risk is not thought to increase with maternal age. Clinical features are variable but include congenital heart defects, cleft palate, developmental delays, immune system deficiencies, and hearing loss. Approximately 90% of cases are de josh (a sporadic new change in a pregnancy). Cell-free DNA screening for 22q11.2 deletion syndrome is available with the inclusion of other microdeletion syndromes. There is less data about the performance of cell-free DNA screening for more rare microdeletions and the chance for false positives or negative may be increased.  We discussed the limitations of cell-free DNA screening in detecting microdeletions and the possiblity of false positives and false negatives. The patient declined microdeletion syndrome screening.    We discussed the following ultrasound options:    Comprehensive level II ultrasound (Fetal Anatomy Ultrasound)  Ultrasound done between 18-20 weeks gestation  Screens for major birth defects and markers for aneuploidy (like trisomy 21 and trisomy 18)  Includes looking at the fetus/baby's growth, heart, organs (stomach, kidneys), placenta, and amniotic fluid    We discussed the following diagnostic options:     Amniocentesis  Invasive diagnostic procedure done after 15 weeks gestation  The procedure collects a small sample of amniotic fluid for the purpose of chromosomal testing and/or other genetic testing  Diagnostic result; more than 99% sensitivity for fetal chromosome abnormalities  Testing for AFP in the amniotic fluid can test for open neural tube defects    It was a pleasure to be involved with Giselle s care. Face-to-face time of the meeting was 30 minutes.    Luz Marina Cameron GC, MS, [facility]  Board Certified and [place] Licensed Genetic Counselor  [facility]  Maternal Fetal Medicine  Office: [phone]  [facility]: [phone]   Fax: [phone]  [facility]

## 2024-08-14 NOTE — PROGRESS NOTES
"Please see \"Imaging\" tab under \"Chart Review\" for details of today's visit.    Veronika Bradley    "

## 2024-08-23 ENCOUNTER — PRENATAL OFFICE VISIT (OUTPATIENT)
Dept: MIDWIFE SERVICES | Facility: CLINIC | Age: 24
End: 2024-08-23
Payer: COMMERCIAL

## 2024-08-23 VITALS
DIASTOLIC BLOOD PRESSURE: 71 MMHG | OXYGEN SATURATION: 98 % | WEIGHT: 181 LBS | HEART RATE: 87 BPM | SYSTOLIC BLOOD PRESSURE: 131 MMHG | BODY MASS INDEX: 30.12 KG/M2

## 2024-08-23 DIAGNOSIS — Z34.82 ENCOUNTER FOR SUPERVISION OF OTHER NORMAL PREGNANCY IN SECOND TRIMESTER: Primary | ICD-10-CM

## 2024-08-23 LAB
GLUCOSE 1H P 50 G GLC PO SERPL-MCNC: 87 MG/DL (ref 70–129)
HGB BLD-MCNC: 12.5 G/DL (ref 11.7–15.7)
T PALLIDUM AB SER QL: NONREACTIVE

## 2024-08-23 PROCEDURE — 86780 TREPONEMA PALLIDUM: CPT | Performed by: ADVANCED PRACTICE MIDWIFE

## 2024-08-23 PROCEDURE — 82950 GLUCOSE TEST: CPT | Performed by: ADVANCED PRACTICE MIDWIFE

## 2024-08-23 PROCEDURE — 99207 PR PRENATAL VISIT: CPT | Performed by: ADVANCED PRACTICE MIDWIFE

## 2024-08-23 PROCEDURE — 36415 COLL VENOUS BLD VENIPUNCTURE: CPT | Performed by: ADVANCED PRACTICE MIDWIFE

## 2024-08-23 NOTE — PROGRESS NOTES
24w4d  Giselle is feeling well today. Reports good fetal movement. Denies leaking of fluid, vaginal bleeding, regular uterine contractions, headache, visual changes, or other concerns. GCT, Hgb, and treponema today. She recently has MFM ultrasound for EIF and did not pursue additional genetic screening. Discussed prepregnancy BMI 36 and recommendation for growth US at 32w at weekly BPP starting at 37w. Discussed increasing risk of stillbirth with increasing BMI. She has no questions. RTC in 4 weeks.    HOA Crawley CNM

## 2024-09-06 ENCOUNTER — HOSPITAL ENCOUNTER (OUTPATIENT)
Facility: CLINIC | Age: 24
End: 2024-09-06
Payer: COMMERCIAL

## 2024-09-17 ENCOUNTER — PRENATAL OFFICE VISIT (OUTPATIENT)
Dept: MIDWIFE SERVICES | Facility: CLINIC | Age: 24
End: 2024-09-17
Payer: COMMERCIAL

## 2024-09-17 VITALS
HEART RATE: 86 BPM | DIASTOLIC BLOOD PRESSURE: 73 MMHG | BODY MASS INDEX: 30.94 KG/M2 | WEIGHT: 185.7 LBS | HEIGHT: 65 IN | TEMPERATURE: 97.9 F | OXYGEN SATURATION: 99 % | SYSTOLIC BLOOD PRESSURE: 118 MMHG

## 2024-09-17 DIAGNOSIS — Z34.83 ENCOUNTER FOR SUPERVISION OF OTHER NORMAL PREGNANCY, THIRD TRIMESTER: Primary | ICD-10-CM

## 2024-09-17 DIAGNOSIS — Z23 NEED FOR TDAP VACCINATION: ICD-10-CM

## 2024-09-17 PROCEDURE — 99207 PR PRENATAL VISIT: CPT | Performed by: ADVANCED PRACTICE MIDWIFE

## 2024-09-17 PROCEDURE — 90715 TDAP VACCINE 7 YRS/> IM: CPT | Performed by: ADVANCED PRACTICE MIDWIFE

## 2024-09-17 ASSESSMENT — PATIENT HEALTH QUESTIONNAIRE - PHQ9
10. IF YOU CHECKED OFF ANY PROBLEMS, HOW DIFFICULT HAVE THESE PROBLEMS MADE IT FOR YOU TO DO YOUR WORK, TAKE CARE OF THINGS AT HOME, OR GET ALONG WITH OTHER PEOPLE: NOT DIFFICULT AT ALL
SUM OF ALL RESPONSES TO PHQ QUESTIONS 1-9: 0
SUM OF ALL RESPONSES TO PHQ QUESTIONS 1-9: 0

## 2024-09-17 NOTE — PROGRESS NOTES
28w1d  Feeling well. No regular contractions, LOF or bleeding. Baby is active.   Pregravid weight listed erroneously as 218. Highest documented weight in our system was 187 and that was at 39w6d with her last pregnancy. At 7 weeks gestation with this pregnancy she was 154lbs. Discussed with patient and she believes she was around 150lbs before getting pregnant. Will change documentation and cancel MFM US as not recommended given this new information.  Requesting maternity support belt. Feels like this baby sits much lower in her pelvis than previous pregnancies. Given today in clinic.    Fundal height measuring 2cm ahead, patient states she believes she was measuring ahead at last visit as well but it isn't documented. Will continue to monitor.  Accepts TDAP today. Declines flu.  Has phone numbers, knows where to go.   RTC in 4 weeks. MML  Answers submitted by the patient for this visit:  Patient Health Questionnaire (Submitted on 9/17/2024)  If you checked off any problems, how difficult have these problems made it for you to do your work, take care of things at home, or get along with other people?: Not difficult at all  PHQ9 TOTAL SCORE: 0

## 2024-10-15 ENCOUNTER — PRENATAL OFFICE VISIT (OUTPATIENT)
Dept: MIDWIFE SERVICES | Facility: CLINIC | Age: 24
End: 2024-10-15
Payer: COMMERCIAL

## 2024-10-15 VITALS
BODY MASS INDEX: 31.92 KG/M2 | TEMPERATURE: 97 F | WEIGHT: 191.6 LBS | OXYGEN SATURATION: 97 % | HEART RATE: 98 BPM | HEIGHT: 65 IN | SYSTOLIC BLOOD PRESSURE: 122 MMHG | DIASTOLIC BLOOD PRESSURE: 70 MMHG

## 2024-10-15 DIAGNOSIS — Z34.93 PRENATAL CARE IN THIRD TRIMESTER: Primary | ICD-10-CM

## 2024-10-15 PROCEDURE — 99207 PR PRENATAL VISIT: CPT | Performed by: ADVANCED PRACTICE MIDWIFE

## 2024-10-15 NOTE — PROGRESS NOTES
Feeling well.  Baby is active. Denies any leaking of fluid, vaginal bleeding, regular uterine contractions, or headaches or other concerns.  Reviewed wt gain. This is normal weight gain for her in her pregnancies.   Reviewed to call for contractions, loss of fluid, vaginal bleeding, decreased fetal movement or any other questions or concerns.    RTC in 2 weeks.  Sophia Jesus, MARGIE, APRN, CNM

## 2024-10-29 ENCOUNTER — PRENATAL OFFICE VISIT (OUTPATIENT)
Dept: MIDWIFE SERVICES | Facility: CLINIC | Age: 24
End: 2024-10-29
Payer: COMMERCIAL

## 2024-10-29 VITALS
DIASTOLIC BLOOD PRESSURE: 79 MMHG | HEART RATE: 107 BPM | OXYGEN SATURATION: 99 % | SYSTOLIC BLOOD PRESSURE: 136 MMHG | WEIGHT: 194 LBS | BODY MASS INDEX: 32.28 KG/M2

## 2024-10-29 DIAGNOSIS — Z34.83 ENCOUNTER FOR SUPERVISION OF OTHER NORMAL PREGNANCY, THIRD TRIMESTER: Primary | ICD-10-CM

## 2024-10-29 PROCEDURE — 99207 PR PRENATAL VISIT: CPT | Performed by: ADVANCED PRACTICE MIDWIFE

## 2024-10-29 NOTE — PROGRESS NOTES
34w1d  Feeling uncomfortable since Sunday. Has been having frequent BH contractions, pelvic pressure that sometimes wraps around to her back. Does not feel like labor contractions, improves with rest. A couple of times yesterday was uncomfortable enough that she felt like she couldn't move very well or quickly. Denies LOF, bleeding. Baby is very active. Has support belt but finds it to be uncomfortable. Denies any vaginal symptoms including itching, burning, irritation or change in discharge. No urinary symptoms. Offered cervical exam. Patient ok with monitoring symptoms for now.   RTC in 2 weeks for GBS, hgb and BSUS to confirm position. Encouraged stretching, Spinning Babies for optimal positioning. MML

## 2024-11-12 ENCOUNTER — PRENATAL OFFICE VISIT (OUTPATIENT)
Dept: MIDWIFE SERVICES | Facility: CLINIC | Age: 24
End: 2024-11-12
Payer: COMMERCIAL

## 2024-11-12 VITALS
DIASTOLIC BLOOD PRESSURE: 74 MMHG | OXYGEN SATURATION: 99 % | HEART RATE: 114 BPM | RESPIRATION RATE: 16 BRPM | WEIGHT: 196.3 LBS | TEMPERATURE: 98 F | BODY MASS INDEX: 32.67 KG/M2 | SYSTOLIC BLOOD PRESSURE: 118 MMHG

## 2024-11-12 DIAGNOSIS — Z34.83 ENCOUNTER FOR SUPERVISION OF OTHER NORMAL PREGNANCY, THIRD TRIMESTER: Primary | ICD-10-CM

## 2024-11-12 LAB
ERYTHROCYTE [DISTWIDTH] IN BLOOD BY AUTOMATED COUNT: 13.9 % (ref 10–15)
FERRITIN SERPL-MCNC: 17 NG/ML (ref 6–175)
HCT VFR BLD AUTO: 38.2 % (ref 35–47)
HGB BLD-MCNC: 12.1 G/DL (ref 11.7–15.7)
HOLD SPECIMEN: NORMAL
IRON BINDING CAPACITY (ROCHE): 548 UG/DL (ref 240–430)
IRON SATN MFR SERPL: 10 % (ref 15–46)
IRON SERPL-MCNC: 53 UG/DL (ref 37–145)
MCH RBC QN AUTO: 24.7 PG (ref 26.5–33)
MCHC RBC AUTO-ENTMCNC: 31.7 G/DL (ref 31.5–36.5)
MCV RBC AUTO: 78 FL (ref 78–100)
PLATELET # BLD AUTO: 215 10E3/UL (ref 150–450)
RBC # BLD AUTO: 4.9 10E6/UL (ref 3.8–5.2)
WBC # BLD AUTO: 9.5 10E3/UL (ref 4–11)

## 2024-11-12 PROCEDURE — 90678 RSV VACC PREF BIVALENT IM: CPT | Performed by: ADVANCED PRACTICE MIDWIFE

## 2024-11-12 PROCEDURE — 85027 COMPLETE CBC AUTOMATED: CPT | Performed by: ADVANCED PRACTICE MIDWIFE

## 2024-11-12 PROCEDURE — 36415 COLL VENOUS BLD VENIPUNCTURE: CPT | Performed by: ADVANCED PRACTICE MIDWIFE

## 2024-11-12 PROCEDURE — 82728 ASSAY OF FERRITIN: CPT | Performed by: ADVANCED PRACTICE MIDWIFE

## 2024-11-12 PROCEDURE — 99207 PR PRENATAL VISIT: CPT | Performed by: ADVANCED PRACTICE MIDWIFE

## 2024-11-12 PROCEDURE — 87653 STREP B DNA AMP PROBE: CPT | Performed by: ADVANCED PRACTICE MIDWIFE

## 2024-11-12 PROCEDURE — 83550 IRON BINDING TEST: CPT | Performed by: ADVANCED PRACTICE MIDWIFE

## 2024-11-12 PROCEDURE — 90471 IMMUNIZATION ADMIN: CPT | Performed by: ADVANCED PRACTICE MIDWIFE

## 2024-11-12 PROCEDURE — 83540 ASSAY OF IRON: CPT | Performed by: ADVANCED PRACTICE MIDWIFE

## 2024-11-12 ASSESSMENT — PATIENT HEALTH QUESTIONNAIRE - PHQ9
SUM OF ALL RESPONSES TO PHQ QUESTIONS 1-9: 1
SUM OF ALL RESPONSES TO PHQ QUESTIONS 1-9: 1
10. IF YOU CHECKED OFF ANY PROBLEMS, HOW DIFFICULT HAVE THESE PROBLEMS MADE IT FOR YOU TO DO YOUR WORK, TAKE CARE OF THINGS AT HOME, OR GET ALONG WITH OTHER PEOPLE: NOT DIFFICULT AT ALL

## 2024-11-12 NOTE — RESULT ENCOUNTER NOTE
Dear Giselle,    Your test results are attached below. Your hemoglobin is at a good level.  If you have any questions, please contact me via [employer] or you can call our office at [phone].    Deanne Hall CNM, HOA HAYWOOD, CNM

## 2024-11-12 NOTE — PROGRESS NOTES
36w1d  Feeling tired. Does report very mild HA intermittently not to the point that she needs to take tylenol- when she drinks water, it goes away. RSV vaccination today. BSUS confirms cephalic presentation. GBS and hgb today. Reports good fetal movement. Denies leaking of fluid, vaginal bleeding, regular uterine contractions, significamt headache or other concerns. Having some BH ctxns.  RTC in 1 wk [facility]    Answers submitted by the patient for this visit:  Patient Health Questionnaire (Submitted on 11/12/2024)  If you checked off any problems, how difficult have these problems made it for you to do your work, take care of things at home, or get along with other people?: Not difficult at all  PHQ9 TOTAL SCORE: 1

## 2024-11-13 LAB — GP B STREP DNA SPEC QL NAA+PROBE: POSITIVE

## 2024-11-13 NOTE — RESULT ENCOUNTER NOTE
Dear Giselle,    Your test results are attached below. The result of your Group Beta Strep (GBS) test was positive. This means that we recommend that you receive antibiotics when you are in labor to prevent your baby from getting an infection. GBS is common, about 1 in 4 women test positive to GBS in pregnancy. Having GBS should not effect your labor or the way in which you are planning to birth your baby. We can discuss GBS and our recommendations for labor at your next appointment.    If you have any questions, please contact me via [employer] or you can call our office at [phone].    Deanne Hall, CHASTITY, APRN ANASTACIOM, CNM

## 2024-11-16 LAB — BACTERIA SPEC CULT: NORMAL

## 2024-11-27 ENCOUNTER — PRENATAL OFFICE VISIT (OUTPATIENT)
Dept: MIDWIFE SERVICES | Facility: CLINIC | Age: 24
End: 2024-11-27
Payer: COMMERCIAL

## 2024-11-27 VITALS
DIASTOLIC BLOOD PRESSURE: 74 MMHG | OXYGEN SATURATION: 98 % | WEIGHT: 200.6 LBS | HEART RATE: 107 BPM | SYSTOLIC BLOOD PRESSURE: 129 MMHG | BODY MASS INDEX: 33.38 KG/M2

## 2024-11-27 DIAGNOSIS — Z34.83 ENCOUNTER FOR SUPERVISION OF OTHER NORMAL PREGNANCY, THIRD TRIMESTER: Primary | ICD-10-CM

## 2024-11-27 NOTE — PROGRESS NOTES
38w2d    Giselle presents for a normal PNV. She reports feeling fatigued by the end of the day as well as having lower back pain. Discussed using heating packs, pillows, stretching, and a support belt to help with this. Reports positive fetal movement, denies ctx, LOF, changes in vision, headache, or bleeding. Discussed positive GBS status. Denies questions or concerns regarding birth. FH: 38.5 . RTC in one week.    Ruthy PRAKASH    I was present with the [employer] student who participated in the service and in the documentation of the services provided. I have verified the history and personally performed the physical exam and medical decision making, as documented by the student and edited by me.     Deanne Goodwin, CHASTITY, APRN CHASTITY HAYWOOD

## 2024-12-11 ENCOUNTER — ANCILLARY PROCEDURE (OUTPATIENT)
Dept: ULTRASOUND IMAGING | Facility: CLINIC | Age: 24
End: 2024-12-11
Attending: ADVANCED PRACTICE MIDWIFE
Payer: COMMERCIAL

## 2024-12-11 ENCOUNTER — NURSE TRIAGE (OUTPATIENT)
Dept: NURSING | Facility: CLINIC | Age: 24
End: 2024-12-11

## 2024-12-11 ENCOUNTER — PRENATAL OFFICE VISIT (OUTPATIENT)
Dept: MIDWIFE SERVICES | Facility: CLINIC | Age: 24
End: 2024-12-11
Attending: ADVANCED PRACTICE MIDWIFE
Payer: COMMERCIAL

## 2024-12-11 VITALS
BODY MASS INDEX: 33.53 KG/M2 | SYSTOLIC BLOOD PRESSURE: 129 MMHG | WEIGHT: 201.5 LBS | HEART RATE: 88 BPM | DIASTOLIC BLOOD PRESSURE: 80 MMHG | OXYGEN SATURATION: 99 %

## 2024-12-11 DIAGNOSIS — O48.0 POST-TERM PREGNANCY, 40-42 WEEKS OF GESTATION: Primary | ICD-10-CM

## 2024-12-11 DIAGNOSIS — Z34.93 PRENATAL CARE IN THIRD TRIMESTER: ICD-10-CM

## 2024-12-11 DIAGNOSIS — Z34.83 ENCOUNTER FOR SUPERVISION OF OTHER NORMAL PREGNANCY, THIRD TRIMESTER: ICD-10-CM

## 2024-12-11 PROCEDURE — 76815 OB US LIMITED FETUS(S): CPT | Performed by: OBSTETRICS & GYNECOLOGY

## 2024-12-11 PROCEDURE — 99207 PR PRENATAL VISIT: CPT | Performed by: ADVANCED PRACTICE MIDWIFE

## 2024-12-11 NOTE — PROGRESS NOTES
Feeling well.  Baby is active. Denies any leaking of fluid, vaginal bleeding, regular uterine contractions, or headaches or other concerns.  BPP done today and normal.  She said that they were not able to schedule her on the date that Deanne recommended. NST done done today as she is not yet 41 weeks.  We discussed induction or postdates testing. She would like testing.    CRISTY is 12/9/24, Deanne had recommended postdates testing for Friday 12/13 or 12/16 will be 41 weeks.  I explained this to the  and they will assist with scheduling.   Cervix checked and membranes swept per her consent.   Reviewed to call for contractions, loss of fluid, vaginal bleeding, decreased fetal movement or any other questions or concerns.    RTC Friday 12/13 or Monday 12/16 .  Sophia Jesus, MARGIE, APRN, CNM

## 2024-12-12 ENCOUNTER — HOSPITAL ENCOUNTER (INPATIENT)
Facility: CLINIC | Age: 24
End: 2024-12-12
Attending: MIDWIFE | Admitting: MIDWIFE
Payer: COMMERCIAL

## 2024-12-12 VITALS
BODY MASS INDEX: 33.62 KG/M2 | DIASTOLIC BLOOD PRESSURE: 68 MMHG | HEIGHT: 65 IN | WEIGHT: 201.8 LBS | RESPIRATION RATE: 16 BRPM | TEMPERATURE: 98.4 F | SYSTOLIC BLOOD PRESSURE: 110 MMHG | HEART RATE: 77 BPM

## 2024-12-12 PROBLEM — Z36.89 ENCOUNTER FOR TRIAGE IN PREGNANT PATIENT: Status: RESOLVED | Noted: 2024-12-12 | Resolved: 2024-12-12

## 2024-12-12 PROBLEM — Z36.89 ENCOUNTER FOR TRIAGE IN PREGNANT PATIENT: Status: ACTIVE | Noted: 2024-12-12

## 2024-12-12 PROBLEM — Z37.9 NORMAL LABOR: Status: RESOLVED | Noted: 2024-12-12 | Resolved: 2024-12-12

## 2024-12-12 PROBLEM — Z37.9 NORMAL LABOR: Status: ACTIVE | Noted: 2024-12-12

## 2024-12-12 LAB
ABO + RH BLD: NORMAL
BLD GP AB SCN SERPL QL: NEGATIVE
HGB BLD-MCNC: 11.4 G/DL (ref 11.7–15.7)
HGB BLD-MCNC: 12.3 G/DL (ref 11.7–15.7)
SPECIMEN EXP DATE BLD: NORMAL
T PALLIDUM AB SER QL: NONREACTIVE

## 2024-12-12 PROCEDURE — 86900 BLOOD TYPING SEROLOGIC ABO: CPT | Performed by: MIDWIFE

## 2024-12-12 PROCEDURE — 59400 OBSTETRICAL CARE: CPT | Performed by: MIDWIFE

## 2024-12-12 PROCEDURE — 36415 COLL VENOUS BLD VENIPUNCTURE: CPT | Performed by: MIDWIFE

## 2024-12-12 PROCEDURE — 250N000011 HC RX IP 250 OP 636: Performed by: MIDWIFE

## 2024-12-12 PROCEDURE — 250N000013 HC RX MED GY IP 250 OP 250 PS 637: Performed by: MIDWIFE

## 2024-12-12 PROCEDURE — 250N000009 HC RX 250: Performed by: MIDWIFE

## 2024-12-12 PROCEDURE — 722N000001 HC LABOR CARE VAGINAL DELIVERY SINGLE

## 2024-12-12 PROCEDURE — 86780 TREPONEMA PALLIDUM: CPT | Performed by: MIDWIFE

## 2024-12-12 PROCEDURE — 85018 HEMOGLOBIN: CPT | Performed by: MIDWIFE

## 2024-12-12 PROCEDURE — 120N000001 HC R&B MED SURG/OB

## 2024-12-12 RX ORDER — CARBOPROST TROMETHAMINE 250 UG/ML
250 INJECTION, SOLUTION INTRAMUSCULAR
Status: DISCONTINUED | OUTPATIENT
Start: 2024-12-12 | End: 2024-12-14 | Stop reason: HOSPADM

## 2024-12-12 RX ORDER — CARBOPROST TROMETHAMINE 250 UG/ML
250 INJECTION, SOLUTION INTRAMUSCULAR
Status: DISCONTINUED | OUTPATIENT
Start: 2024-12-12 | End: 2024-12-12 | Stop reason: HOSPADM

## 2024-12-12 RX ORDER — FENTANYL CITRATE 50 UG/ML
50 INJECTION, SOLUTION INTRAMUSCULAR; INTRAVENOUS EVERY 30 MIN PRN
Status: DISCONTINUED | OUTPATIENT
Start: 2024-12-12 | End: 2024-12-12 | Stop reason: HOSPADM

## 2024-12-12 RX ORDER — BISACODYL 10 MG
10 SUPPOSITORY, RECTAL RECTAL DAILY PRN
Status: DISCONTINUED | OUTPATIENT
Start: 2024-12-12 | End: 2024-12-14 | Stop reason: HOSPADM

## 2024-12-12 RX ORDER — OXYTOCIN 10 [USP'U]/ML
10 INJECTION, SOLUTION INTRAMUSCULAR; INTRAVENOUS
Status: DISCONTINUED | OUTPATIENT
Start: 2024-12-12 | End: 2024-12-12 | Stop reason: HOSPADM

## 2024-12-12 RX ORDER — NALOXONE HYDROCHLORIDE 0.4 MG/ML
0.4 INJECTION, SOLUTION INTRAMUSCULAR; INTRAVENOUS; SUBCUTANEOUS
Status: DISCONTINUED | OUTPATIENT
Start: 2024-12-12 | End: 2024-12-12 | Stop reason: HOSPADM

## 2024-12-12 RX ORDER — METOCLOPRAMIDE 10 MG/1
10 TABLET ORAL EVERY 6 HOURS PRN
Status: DISCONTINUED | OUTPATIENT
Start: 2024-12-12 | End: 2024-12-12 | Stop reason: HOSPADM

## 2024-12-12 RX ORDER — MISOPROSTOL 200 UG/1
800 TABLET ORAL
Status: DISCONTINUED | OUTPATIENT
Start: 2024-12-12 | End: 2024-12-12 | Stop reason: HOSPADM

## 2024-12-12 RX ORDER — CLINDAMYCIN PHOSPHATE 900 MG/50ML
900 INJECTION, SOLUTION INTRAVENOUS EVERY 8 HOURS
Status: DISCONTINUED | OUTPATIENT
Start: 2024-12-12 | End: 2024-12-12 | Stop reason: HOSPADM

## 2024-12-12 RX ORDER — OXYTOCIN/0.9 % SODIUM CHLORIDE 30/500 ML
340 PLASTIC BAG, INJECTION (ML) INTRAVENOUS CONTINUOUS PRN
Status: DISCONTINUED | OUTPATIENT
Start: 2024-12-12 | End: 2024-12-14 | Stop reason: HOSPADM

## 2024-12-12 RX ORDER — NALOXONE HYDROCHLORIDE 0.4 MG/ML
0.2 INJECTION, SOLUTION INTRAMUSCULAR; INTRAVENOUS; SUBCUTANEOUS
Status: DISCONTINUED | OUTPATIENT
Start: 2024-12-12 | End: 2024-12-12 | Stop reason: HOSPADM

## 2024-12-12 RX ORDER — MISOPROSTOL 200 UG/1
400 TABLET ORAL
Status: DISCONTINUED | OUTPATIENT
Start: 2024-12-12 | End: 2024-12-12 | Stop reason: HOSPADM

## 2024-12-12 RX ORDER — MODIFIED LANOLIN
OINTMENT (GRAM) TOPICAL
Status: DISCONTINUED | OUTPATIENT
Start: 2024-12-12 | End: 2024-12-14 | Stop reason: HOSPADM

## 2024-12-12 RX ORDER — METHYLERGONOVINE MALEATE 0.2 MG/ML
200 INJECTION INTRAVENOUS
Status: DISCONTINUED | OUTPATIENT
Start: 2024-12-12 | End: 2024-12-14 | Stop reason: HOSPADM

## 2024-12-12 RX ORDER — METHYLERGONOVINE MALEATE 0.2 MG/ML
200 INJECTION INTRAVENOUS
Status: DISCONTINUED | OUTPATIENT
Start: 2024-12-12 | End: 2024-12-12 | Stop reason: HOSPADM

## 2024-12-12 RX ORDER — LOPERAMIDE HYDROCHLORIDE 2 MG/1
4 CAPSULE ORAL
Status: DISCONTINUED | OUTPATIENT
Start: 2024-12-12 | End: 2024-12-12 | Stop reason: HOSPADM

## 2024-12-12 RX ORDER — METOCLOPRAMIDE HYDROCHLORIDE 5 MG/ML
10 INJECTION INTRAMUSCULAR; INTRAVENOUS EVERY 6 HOURS PRN
Status: DISCONTINUED | OUTPATIENT
Start: 2024-12-12 | End: 2024-12-12 | Stop reason: HOSPADM

## 2024-12-12 RX ORDER — IBUPROFEN 800 MG/1
800 TABLET, FILM COATED ORAL EVERY 6 HOURS PRN
Status: DISCONTINUED | OUTPATIENT
Start: 2024-12-12 | End: 2024-12-14 | Stop reason: HOSPADM

## 2024-12-12 RX ORDER — TRANEXAMIC ACID 10 MG/ML
1 INJECTION, SOLUTION INTRAVENOUS EVERY 30 MIN PRN
Status: DISCONTINUED | OUTPATIENT
Start: 2024-12-12 | End: 2024-12-14 | Stop reason: HOSPADM

## 2024-12-12 RX ORDER — HYDROCORTISONE 25 MG/G
CREAM TOPICAL 3 TIMES DAILY PRN
Status: DISCONTINUED | OUTPATIENT
Start: 2024-12-12 | End: 2024-12-14 | Stop reason: HOSPADM

## 2024-12-12 RX ORDER — DOCUSATE SODIUM 100 MG/1
100 CAPSULE, LIQUID FILLED ORAL DAILY
Status: DISCONTINUED | OUTPATIENT
Start: 2024-12-12 | End: 2024-12-14 | Stop reason: HOSPADM

## 2024-12-12 RX ORDER — KETOROLAC TROMETHAMINE 30 MG/ML
30 INJECTION, SOLUTION INTRAMUSCULAR; INTRAVENOUS
Status: COMPLETED | OUTPATIENT
Start: 2024-12-12 | End: 2024-12-12

## 2024-12-12 RX ORDER — ONDANSETRON 2 MG/ML
4 INJECTION INTRAMUSCULAR; INTRAVENOUS EVERY 6 HOURS PRN
Status: DISCONTINUED | OUTPATIENT
Start: 2024-12-12 | End: 2024-12-12 | Stop reason: HOSPADM

## 2024-12-12 RX ORDER — PROCHLORPERAZINE MALEATE 10 MG
10 TABLET ORAL EVERY 6 HOURS PRN
Status: DISCONTINUED | OUTPATIENT
Start: 2024-12-12 | End: 2024-12-12 | Stop reason: HOSPADM

## 2024-12-12 RX ORDER — IBUPROFEN 800 MG/1
800 TABLET, FILM COATED ORAL
Status: COMPLETED | OUTPATIENT
Start: 2024-12-12 | End: 2024-12-12

## 2024-12-12 RX ORDER — OXYTOCIN 10 [USP'U]/ML
10 INJECTION, SOLUTION INTRAMUSCULAR; INTRAVENOUS
Status: DISCONTINUED | OUTPATIENT
Start: 2024-12-12 | End: 2024-12-14 | Stop reason: HOSPADM

## 2024-12-12 RX ORDER — OXYTOCIN/0.9 % SODIUM CHLORIDE 30/500 ML
100-340 PLASTIC BAG, INJECTION (ML) INTRAVENOUS CONTINUOUS PRN
Status: DISCONTINUED | OUTPATIENT
Start: 2024-12-12 | End: 2024-12-14 | Stop reason: HOSPADM

## 2024-12-12 RX ORDER — OXYTOCIN/0.9 % SODIUM CHLORIDE 30/500 ML
340 PLASTIC BAG, INJECTION (ML) INTRAVENOUS CONTINUOUS PRN
Status: DISCONTINUED | OUTPATIENT
Start: 2024-12-12 | End: 2024-12-12 | Stop reason: HOSPADM

## 2024-12-12 RX ORDER — CITRIC ACID/SODIUM CITRATE 334-500MG
30 SOLUTION, ORAL ORAL
Status: DISCONTINUED | OUTPATIENT
Start: 2024-12-12 | End: 2024-12-12 | Stop reason: HOSPADM

## 2024-12-12 RX ORDER — MISOPROSTOL 200 UG/1
400 TABLET ORAL
Status: DISCONTINUED | OUTPATIENT
Start: 2024-12-12 | End: 2024-12-14 | Stop reason: HOSPADM

## 2024-12-12 RX ORDER — LIDOCAINE 40 MG/G
CREAM TOPICAL
Status: DISCONTINUED | OUTPATIENT
Start: 2024-12-12 | End: 2024-12-12 | Stop reason: HOSPADM

## 2024-12-12 RX ORDER — MISOPROSTOL 200 UG/1
800 TABLET ORAL
Status: DISCONTINUED | OUTPATIENT
Start: 2024-12-12 | End: 2024-12-14 | Stop reason: HOSPADM

## 2024-12-12 RX ORDER — ACETAMINOPHEN 325 MG/1
650 TABLET ORAL EVERY 4 HOURS PRN
Status: DISCONTINUED | OUTPATIENT
Start: 2024-12-12 | End: 2024-12-12 | Stop reason: HOSPADM

## 2024-12-12 RX ORDER — LOPERAMIDE HYDROCHLORIDE 2 MG/1
4 CAPSULE ORAL
Status: DISCONTINUED | OUTPATIENT
Start: 2024-12-12 | End: 2024-12-14 | Stop reason: HOSPADM

## 2024-12-12 RX ORDER — LOPERAMIDE HYDROCHLORIDE 2 MG/1
2 CAPSULE ORAL
Status: DISCONTINUED | OUTPATIENT
Start: 2024-12-12 | End: 2024-12-12 | Stop reason: HOSPADM

## 2024-12-12 RX ORDER — TRANEXAMIC ACID 10 MG/ML
1 INJECTION, SOLUTION INTRAVENOUS EVERY 30 MIN PRN
Status: DISCONTINUED | OUTPATIENT
Start: 2024-12-12 | End: 2024-12-12 | Stop reason: HOSPADM

## 2024-12-12 RX ORDER — ONDANSETRON 4 MG/1
4 TABLET, ORALLY DISINTEGRATING ORAL EVERY 6 HOURS PRN
Status: DISCONTINUED | OUTPATIENT
Start: 2024-12-12 | End: 2024-12-12 | Stop reason: HOSPADM

## 2024-12-12 RX ORDER — ACETAMINOPHEN 325 MG/1
650 TABLET ORAL EVERY 4 HOURS PRN
Status: DISCONTINUED | OUTPATIENT
Start: 2024-12-12 | End: 2024-12-14 | Stop reason: HOSPADM

## 2024-12-12 RX ORDER — LOPERAMIDE HYDROCHLORIDE 2 MG/1
2 CAPSULE ORAL
Status: DISCONTINUED | OUTPATIENT
Start: 2024-12-12 | End: 2024-12-14 | Stop reason: HOSPADM

## 2024-12-12 RX ADMIN — CLINDAMYCIN PHOSPHATE 900 MG: 900 INJECTION, SOLUTION INTRAVENOUS at 01:36

## 2024-12-12 RX ADMIN — KETOROLAC TROMETHAMINE 30 MG: 30 INJECTION, SOLUTION INTRAMUSCULAR at 03:18

## 2024-12-12 RX ADMIN — IBUPROFEN 800 MG: 800 TABLET, FILM COATED ORAL at 16:18

## 2024-12-12 RX ADMIN — DOCUSATE SODIUM 100 MG: 100 CAPSULE, LIQUID FILLED ORAL at 09:02

## 2024-12-12 RX ADMIN — IBUPROFEN 800 MG: 800 TABLET, FILM COATED ORAL at 09:02

## 2024-12-12 RX ADMIN — Medication 340 ML/HR: at 03:01

## 2024-12-12 ASSESSMENT — ACTIVITIES OF DAILY LIVING (ADL)
ADLS_ACUITY_SCORE: 27
ADLS_ACUITY_SCORE: 50
ADLS_ACUITY_SCORE: 27

## 2024-12-12 NOTE — PROGRESS NOTES
Starting to feel pressure with contractions. First dose of antibiotic infusing. Cervical exam 8-9cm/0  intact BOW. Enc position change prn.anticipate

## 2024-12-12 NOTE — TELEPHONE ENCOUNTER
OB Triage Call    Is patient's OB/Midwife with the formerly [facility] or [facility]? [facility]- Proceed with triage     Reason for call: Patient calling. She had contractions starting at 8 pm, and for the past hour, her contractions are 7 minutes apart, and are getting much closer and much stronger.     Assessment: Active labor.     Plan: vaginal delivery    Patient plans to deliver at [facility]    Patient's primary OB Provider is [facility].      Per protocol recommendations Consult needed for [facility] MD or Midwife.  Patient's primary OB is [facility]. Paged on-call midwife for patient's primary OB clinic (refer to where patient is seen as midwives may go to multiple locations) JUS Goodwin to call [facility] back at [phone].  Call returned at [phone] and advised on triage assessment. Does midwife recommend L&D evaluation? Yes-  Labor and delivery at [facility] ([phone]) notified of patient's pending arrival. Patient notified to go to L&D by RN. Report called to not called to [facility] due to divert status.        Is patient's delivering hospital on divert? Yes. Patient's hospital of choice is on divert, explain to patient their current hospital of choice is not accepting patients. Review other [facility] locations with patient.     Patient's second hospital of choice Delivering Hospital: [facility] ([phone]).  L&D notified of patient's pending arrival. Report given to HARRISON Simpson.      Route encounter to primary OB provider as FYI.     OB/NICU capacity document updated with patient information by triage RN:No. Already completed .       40w2d    Estimated Date of Delivery: Dec 9, 2024        OB History    Para Term  AB Living   3 2 2 0 0 2   SAB IAB Ectopic Multiple Live Births   0 0 0 0 2      # Outcome Date GA Lbr Addison/2nd Weight Sex Type Anes PTL Lv   3 Current            2 Term 23 39w6d / 00:05 3.46 kg (7 lb 10.1 oz) F Vag-Spont None N CJ      Birth  "Comments: nuchal cord and around shoulders      Name: Glenna      Apgar1: 9  Apgar5: 9   1 Term 22 42w0d  3.657 kg (8 lb 1 oz) F  None  CJ      Name: Yoanna       No results found for: \"GBS\"       Ivis Marroquin RN   2024 12:16 AM    Reason for Disposition   [1] History of prior delivery (multipara) AND [2] contractions < 10 minutes apart AND [3] present 1 hour    Additional Information   Negative: Passed out (i.e., lost consciousness, collapsed and was not responding)   Negative: Shock suspected (e.g., cold/pale/clammy skin, too weak to stand, low BP, rapid pulse)   Negative: Difficult to awaken or acting confused (e.g., disoriented, slurred speech)   Negative: [1] SEVERE abdominal pain (e.g., excruciating) AND [2] constant AND [3] present > 1 hour   Negative: SEVERE bleeding (e.g., continuous red blood from vagina, or large blood clots)   Negative: Umbilical cord hanging out of the vagina (shiny, white, curled appearance, \"like telephone cord\")   Negative: Uncontrollable urge to push (i.e., feels like baby is coming out now)   Negative: Can see baby   Negative: Sounds like a life-threatening emergency to the triager   Negative: Pregnant < 37 weeks (i.e., )   Negative: [1] Uncertain delivery date AND [2] possibly pregnant < 37 weeks (i.e., )   Negative: [1] First baby (primipara) AND [2] contractions < 6 minutes apart  AND [3] present 2 hours    Protocols used: Pregnancy - Labor-A-AH    "

## 2024-12-12 NOTE — L&D DELIVERY NOTE
OB Vaginal Delivery Note    Giselle Edwards MRN# [id number]   Age: 24 year old YOB: 2000       GA: 40w3d  GP:   Labor Complications:    EBL:   mL  Delivery QBL: 200 mL  Delivery Type: Vaginal, Spontaneous  ROM to Delivery Time: (Delivered) Minutes: 2   Weight: 3.53 kg (7 lb 12.5 oz)   1 Minute 5 Minute 10 Minute   Apgar Totals: 8   9        MARGARITA WEBB;BETTE RICHEY    Delivery Details:  Giselle Edwards, a 24 year old  female delivered a viable infant with apgars of 8  and 9 . Patient was fully dilated and pushing after   hours   minutes in active labor. Delivery was via vaginal, spontaneous to a sterile field under none anesthesia. Infant delivered in vertex right occiput anterior position. Anterior and posterior shoulders delivered without difficulty. The cord was clamped, cut twice and 3 vessels were noted. Cord blood was obtained in routine fashion with the following disposition: discard.      Cord complications: nuchal  Placenta delivered at 2024  2:59 AM. Placental disposition was Hospital disposal. Fundal massage performed and fundus found to be firm.     Episiotomy: none   Perineum, vagina, cervix were inspected, and the following lacerations were noted:   Perineal lacerations: 1st hemostatic,no repair needed               Excellent hemostasis was noted. Needle count correct. Infant and patient in delivery room in good and stable condition.        Nayeli Edwards-Giselle [[id number]]      Labor Event Times      Latent labor onset date/time: 2024 2200    Active labor onset date: 24 Onset time: 12:57 AM   Dilation complete date: 24 Complete time:  2:48 AM   Start pushing date/time: 2024 0248          Labor Events     labor?: No   steroids: None  Labor Type: Spontaneous  Predominate monitoring during 1st stage: intermittent auscultation     Antibiotics received during labor?: Yes  Reason for Antibiotics: GBS  Antibiotics  "received for GBS: Clindamycin  Antibiotics Given (GBS): Less than or equal to 4 hours prior to delivery     Rupture identifier: Sac 1  Rupture date/time: 24 0252   Rupture type: Spontaneous Rupture of Membranes  Fluid color: Clear  Fluid odor: Normal       Delivery/Placenta Date and Time      Delivery Date: 24 Delivery Time:  2:54 AM   Placenta Date/Time: 2024  2:59 AM  Oxytocin given at the time of delivery: after delivery of placenta  Delivering clinician: Lynda Carrillo CNM   Other personnel present at delivery:  Provider Role   Alexandria Webb RN Registered Nurse   Selin Melo RN Registered Nurse             Vaginal Counts       Initial count performed by 2 team members:  Two Team Members   Alexandria Carrillo CNM         Needles Suture Needles Sponges (RETIRED) Instruments   Initial counts 0 0 0    Added to count       Relief counts       Final counts 0 0 0            Placed during labor Accounted for at the end of labor   FSE No NA   IUPC No NA   Cervidil No NA                  Final count performed by 2 team members:  Two Team Members   CHASTITY Gayle RN      Final count correct?: Yes       Apgars    Living status: Living   1 Minute 5 Minute 10 Minute 15 Minute 20 Minute   Skin color: 0  1       Heart rate: 2  2       Reflex irritability: 2  2       Muscle tone: 2  2       Respiratory effort: 2  2       Total: 8  9       Apgars assigned by: AELXANDRIA WEBB RN       Cord      Vessels: 3 Vessels    Cord Complications: Nuchal   Nuchal Intervention: reduced         Nuchal cord description: loose nuchal cord         Cord Blood Disposition: Discard    Gases Sent?: No    Delayed cord clamping?: Yes    Cord Clamping Delay (seconds): >120 seconds    Stem cell collection?: No           Dennison Resuscitation    Methods: None  Output in Delivery Room: Stool        Measurements      Weight: 7 lb 12.5 oz Length: 1' 8.5\"     Head circumference: 35 cm  "   Output in delivery room: Stool       Skin to Skin and Feeding Plan      Skin to skin initiation date/time: 1/12/1841    Skin to skin with: Mother  Skin to skin end date/time:            Delivery (Maternal) (Provider to Complete) ([id number])    Episiotomy: None  Perineal lacerations: 1st Repaired?: No   Repair suture: None  Genital tract inspection done: Pos       Blood Loss  Mother: Giselle Edwards #[id number]     Start of Mother's Information      Delivery Blood Loss   Intrapartum & Postpartum: 12/12/24 0057 - 12/12/24 0405    Delivery Admission: 12/12/24 0031 - 12/12/24 0405         Intrapartum & Postpartum Delivery Admission    Delivery QBL (mL) Hospital Encounter 200 mL 200 mL    Total  200 mL 200 mL               End of Mother's Information  Mother: Giselle Edwards #[id number]                Delivery - Provider to Complete ([id number])    Delivering clinician: Lynda Carrillo CNM  Delivery Type (Choose the 1 that will go to the Birth History): Vaginal, Spontaneous                         Other personnel:  Provider Role   Alexandria Stringer, RN Registered Nurse   Selin Melo RN Registered Nurse                    Placenta    Date/Time: 12/12/2024  2:59 AM  Removal: Spontaneous  Disposition: Hospital disposal             Anesthesia    Method: None                    Presentation and Position    Presentation: Vertex    Position: Right Occiput Anterior                     Lynda Carrillo DNP,APRN,CNM

## 2024-12-12 NOTE — TELEPHONE ENCOUNTER
" OB Triage Call      Is patient's OB/Midwife with the [facility] or [facility]? [facility]- Proceed with triage     Reason for call: Contractions    Assessment: Patient calling reporting contractions every 7 minutes for the past 40 minutes.     Plan: Home care    Patient plans to deliver at [facility]    Patient's primary OB Provider is Midwives.      Per protocol recommendations Patient to follow home care recommendations.       Is patient's delivering hospital on divert? No      40w2d    Estimated Date of Delivery: Dec 9, 2024        OB History    Para Term  AB Living   3 2 2 0 0 2   SAB IAB Ectopic Multiple Live Births   0 0 0 0 2      # Outcome Date GA Lbr Addison/2nd Weight Sex Type Anes PTL Lv   3 Current            2 Term 23 39w6d / 00:05 3.46 kg (7 lb 10.1 oz) F Vag-Spont None N CJ      Birth Comments: nuchal cord and around shoulders      Name: Glenna      Apgar1: 9  Apgar5: 9   1 Term 22 42w0d  3.657 kg (8 lb 1 oz) F  None  CJ      Name: Yoanna       No results found for: \"GBS\"       Shawna Whelan RN   Reason for Disposition   [1] History of prior delivery (multipara) AND [2] contractions > 10 minutes OR for < 1 hour    Additional Information   Negative: Difficult to awaken or acting confused (e.g., disoriented, slurred speech)   Negative: Passed out (i.e., lost consciousness, collapsed and was not responding)   Negative: Shock suspected (e.g., cold/pale/clammy skin, too weak to stand, low BP, rapid pulse)   Negative: [1] SEVERE abdominal pain (e.g., excruciating) AND [2] constant AND [3] present > 1 hour   Negative: SEVERE bleeding (e.g., continuous red blood from vagina, or large blood clots)   Negative: Umbilical cord hanging out of the vagina (shiny, white, curled appearance, \"like telephone cord\")   Negative: Can see baby   Negative: Uncontrollable urge to push (i.e., feels like baby is coming out now)   Negative: Sounds like a life-threatening emergency to the " triager   Negative: Pregnant < 37 weeks (i.e., )   Negative: [1] Uncertain delivery date AND [2] possibly pregnant < 37 weeks (i.e., )   Negative: [1] First baby (primipara) AND [2] contractions < 6 minutes apart  AND [3] present 2 hours   Negative: [1] History of prior delivery (multipara) AND [2] contractions < 10 minutes apart AND [3] present 1 hour   Negative: [1] History of rapid prior delivery AND [2] contractions < 10 minutes apart   Negative: [1] Leakage of fluid from vagina AND [2] green or brown in color   Negative: Leakage of fluid from vagina   Negative: Vaginal bleeding or spotting  (Exception: Brief spotting after intercourse or pelvic exam.)   Negative: Baby moving less today (e.g., kick count < 5 in 1 hour or < 10 in 2 hours)   Negative: Severe headache or headache that won't go away   Negative: New blurred vision or vision changes   Negative: MODERATE-SEVERE abdominal pain   Negative: [1] MILD abdominal pain (e.g., doesn't interfere with normal activities) AND [2] constant AND [3] present > 2 hours   Negative: Fever 100.4 F (38.0 C) or higher   Negative: Patient sounds very sick or weak to the triager   Negative: [1] First baby (primipara) AND [2] contractions < 10 minutes apart AND [3] present 4 hours or longer   Negative: [1] First baby (primipara) AND [2] contractions > 5 minutes apart OR for < 2 hours    Protocols used: Pregnancy - Labor-A-

## 2024-12-12 NOTE — TELEPHONE ENCOUNTER
"OB Triage Call    Is patient's OB/Midwife with the [facility] or [facility]? [facility]- Proceed with triage     Reason for call: Patient calling with contractions every 13 minutes for the last 1.5 hours.  Notes that they are 'demanding', but she is able to talk through them.  Reports good fetal movement today. Denies leaking of fluid, bleeding, vision changes, headache, epigastric pain.     Assessment: contractions every 13 minutes X 1.5 hours    Plan: Home Care    Patient plans to deliver at [facility]    Patient's primary OB Provider is Midwives.      Per protocol recommendations Patient to follow home care recommendations.       Is patient's delivering hospital on divert? Yes. Patient's hospital of choice is on divert.     *Did not discuss unit closure with patient as she was not going to be going to LD now.        40w2d    Estimated Date of Delivery: Dec 9, 2024        OB History    Para Term  AB Living   3 2 2 0 0 2   SAB IAB Ectopic Multiple Live Births   0 0 0 0 2      # Outcome Date GA Lbr Addison/2nd Weight Sex Type Anes PTL Lv   3 Current            2 Term 23 39w6d / 00:05 3.46 kg (7 lb 10.1 oz) F Vag-Spont None N CJ      Birth Comments: nuchal cord and around shoulders      Name: Glenna      Apgar1: 9  Apgar5: 9   1 Term 22 42w0d  3.657 kg (8 lb 1 oz) F  None  CJ      Name: Yoanna       No results found for: \"GBS\"       Roma Minaya RN     Reason for Disposition   [1] History of prior delivery (multipara) AND [2] contractions > 10 minutes OR for < 1 hour    Additional Information   Negative: Passed out (i.e., lost consciousness, collapsed and was not responding)   Negative: Shock suspected (e.g., cold/pale/clammy skin, too weak to stand, low BP, rapid pulse)   Negative: Difficult to awaken or acting confused (e.g., disoriented, slurred speech)   Negative: [1] SEVERE abdominal pain (e.g., excruciating) AND [2] constant AND [3] present > 1 hour   " "Negative: SEVERE bleeding (e.g., continuous red blood from vagina, or large blood clots)   Negative: Umbilical cord hanging out of the vagina (shiny, white, curled appearance, \"like telephone cord\")   Negative: Uncontrollable urge to push (i.e., feels like baby is coming out now)   Negative: Can see baby   Negative: Sounds like a life-threatening emergency to the triager   Negative: Pregnant < 37 weeks (i.e., )   Negative: [1] Uncertain delivery date AND [2] possibly pregnant < 37 weeks (i.e., )   Negative: [1] First baby (primipara) AND [2] contractions < 6 minutes apart  AND [3] present 2 hours   Negative: [1] History of prior delivery (multipara) AND [2] contractions < 10 minutes apart AND [3] present 1 hour   Negative: [1] History of rapid prior delivery AND [2] contractions < 10 minutes apart   Negative: [1] Leakage of fluid from vagina AND [2] green or brown in color   Negative: Leakage of fluid from vagina   Negative: Vaginal bleeding or spotting  (Exception: Brief spotting after intercourse or pelvic exam.)   Negative: Baby moving less today (e.g., kick count < 5 in 1 hour or < 10 in 2 hours)   Negative: Severe headache or headache that won't go away   Negative: New blurred vision or vision changes   Negative: MODERATE-SEVERE abdominal pain   Negative: [1] MILD abdominal pain (e.g., doesn't interfere with normal activities) AND [2] constant AND [3] present > 2 hours   Negative: Fever 100.4 F (38.0 C) or higher   Negative: Patient sounds very sick or weak to the triager   Negative: [1] First baby (primipara) AND [2] contractions < 10 minutes apart AND [3] present 4 hours or longer   Negative: [1] First baby (primipara) AND [2] contractions > 5 minutes apart OR for < 2 hours    Protocols used: Pregnancy - Labor-A-        "

## 2024-12-12 NOTE — PROGRESS NOTES
Day of Delivery:  Feeling well!  Family is here to visit Giselle and baby Luisa.  Some increased cramping during nursing but overall pain is well controlled.  Has been up and showered, voided, ate breakfast.  3 successful nursing sessions since birth.  Feels latch is good, denies concerns.  Plans 2 day stay per Ped recommendation due to GBS positive.  Encouraged rest, skin to skin and focus on nursing today.  Anticipate discharge Saturday AM.  HOA Boyce, CNM

## 2024-12-12 NOTE — PROGRESS NOTES
Giselle Edwards, , 40w3d, arrived to triage, accompanied by spouse. Patient here with c/o contractions since 200, closer together last 2 hours. Patient under care of [facility], did consult provider prior to arriving to triage.     Patient reports contractions. Patient denies SROM. Patient denies vaginal bleeding. Patient denies s/s of preeclampsia. Fetal movement Present. Patient denies issues this pregnancy.    Patient oriented to room, call light in reach. EFM and toco applied. Triage orders placed.  Provider updated, in to see patient.    Selin Melo RN

## 2024-12-12 NOTE — H&P
Labor & Delivery Admission Note:    Date: 2024  Time: 1:07 AM    Admission H&P  Giselle Edwards,  2000, MRN [id number]    Encounter for triage in pregnant patient  Normal labor    PCP: No Ref-Primary, Physician, None   Code status:  Full Code       Extended Emergency Contact Information  Primary Emergency Contact: Bolivar Edwards  Address: [address] [place] [address] [facility]  Home Phone: [phone]  Mobile Phone: [phone]  Relation: Spouse  Secondary Emergency Contact: Nilda Lopez  Address: [address] [place] [address] [facility]  Home Phone: [phone]  Mobile Phone: [phone]  Relation: Mother       Chief Complaint: Normal labor     HPI:      Giselle Edwards, is a 24 year old,  AT 40w3d, LMP 24, Estimated Date of Delivery: Dec 9, 2024, confirmed by 7 wk ultrasound, admitted to [facility] on 2024 at 0100 secondary to: onset of contractions @ . Patient was planning delivery at [facility] but was diverted to [facility] in labor. CNM direct YAMILETH in labor.      Prenatal History:  Giselle Edwards transferred care from [place] [facility] in labor antepartum care on 24 at 7 weeks gestation. She had a total of 13 visits before transfer of care in labor. Review of those records show the following concerns/problems hx of migraines with aura, retinal artery occlusion, sciatica, +GBS, constipation and eczema.      Pertinent Labs:    Prenatal Office Visit on 2024   Component Date Value Ref Range Status    Group B Strep PCR 2024 Positive (A)  Negative Final    Subsequent culture and susceptibility will be performed.  ALERT: Streptococcus agalactiae (Group B Streptococcus) has a high rate of resistance to clindamycin. Therefore, clindamycin is not recommended for treatment unless susceptibility testing has been performed.    WBC Count 2024 9.5  4.0 - 11.0 10e3/uL Final    RBC Count 2024 4.90  3.80 -  5.20 10e6/uL Final    Hemoglobin 11/12/2024 12.1  11.7 - 15.7 g/dL Final    Hematocrit 11/12/2024 38.2  35.0 - 47.0 % Final    MCV 11/12/2024 78  78 - 100 fL Final    MCH 11/12/2024 24.7 (L)  26.5 - 33.0 pg Final    MCHC 11/12/2024 31.7  31.5 - 36.5 g/dL Final    RDW 11/12/2024 13.9  10.0 - 15.0 % Final    Platelet Count 11/12/2024 215  150 - 450 10e3/uL Final    Hold Specimen 11/12/2024 JIC   Final    Iron 11/12/2024 53  37 - 145 ug/dL Final    Iron Binding Capacity 11/12/2024 548 (H)  240 - 430 ug/dL Final    Iron Sat Index 11/12/2024 10 (L)  15 - 46 % Final    Ferritin 11/12/2024 17  6 - 175 ng/mL Final    Culture 11/12/2024 Streptococcus agalactiae (Group B Streptococcus) not isolated upon subculture   Final   Prenatal Office Visit on 08/23/2024   Component Date Value Ref Range Status    Hemoglobin 08/23/2024 12.5  11.7 - 15.7 g/dL Final    Glu Gest Screen 1hr 50g 08/23/2024 87  70 - 129 mg/dL Final    Treponema Antibody Total 08/23/2024 Nonreactive  Nonreactive Final   Prenatal Office Visit on 04/26/2024   Component Date Value Ref Range Status    Color Urine 04/26/2024 Yellow  Colorless, Straw, Light Yellow, Yellow Final    Appearance Urine 04/26/2024 Clear  Clear Final    Glucose Urine 04/26/2024 Negative  Negative mg/dL Final    Bilirubin Urine 04/26/2024 Negative  Negative Final    Ketones Urine 04/26/2024 Negative  Negative mg/dL Final    Specific Gravity Urine 04/26/2024 1.010  1.003 - 1.035 Final    Blood Urine 04/26/2024 Negative  Negative Final    pH Urine 04/26/2024 6.0  5.0 - 7.0 Final    Protein Albumin Urine 04/26/2024 Negative  Negative mg/dL Final    Urobilinogen Urine 04/26/2024 0.2  0.2, 1.0 E.U./dL Final    Nitrite Urine 04/26/2024 Negative  Negative Final    Leukocyte Esterase Urine 04/26/2024 Negative  Negative Final    Hepatitis C Antibody 04/26/2024 Nonreactive  Nonreactive Final    A nonreactive screening test result does not exclude the possibility of exposure to or infection with HCV.  Nonreactive screening test results in individuals with prior exposure to HCV may be due to antibody levels below the limit of detection of this assay or lack of reactivity to the HCV antigens used in this assay. Patients with recent HCV infections (<3 months from time of exposure) may have false-negative HCV antibody results due to the time needed for seroconversion (average of 8 to 9 weeks).    Hepatitis B Surface Antigen 2024 Nonreactive  Nonreactive Final    WBC Count 2024 6.5  4.0 - 11.0 10e3/uL Final    RBC Count 2024 5.03  3.80 - 5.20 10e6/uL Final    Hemoglobin 2024 13.3  11.7 - 15.7 g/dL Final    Hematocrit 2024 40.2  35.0 - 47.0 % Final    MCV 2024 80  78 - 100 fL Final    MCH 2024 26.4 (L)  26.5 - 33.0 pg Final    MCHC 2024 33.1  31.5 - 36.5 g/dL Final    RDW 2024 13.1  10.0 - 15.0 % Final    Platelet Count 2024 262  150 - 450 10e3/uL Final    HIV Antigen Antibody Combo 2024 Nonreactive  Nonreactive Final    Negative HIV-1 p24 antigen and HIV-1/2 antibody screening test results usually indicate the absence of HIV-1 and HIV-2 infection. However, such negative results do not rule-out acute HIV infection.  If acute HIV-1 or HIV-2 infection is suspected, detection of HIV-1 or HIV-2 RNA  is recommended.     Rubella Riya IgG Instrument Value 2024 5.63  <0.90 Index Final    Rubella Antibody IgG 2024 Positive   Final    Suggests previous exposure or immunization and probable immunity.    Treponema Antibody Total 2024 Nonreactive  Nonreactive Final    Culture 2024 No Growth   Final    ABO/RH(D) 2024 A POS   Final    Antibody Screen 2024 Negative  Negative Final    SPECIMEN EXPIRATION DATE 2024 [id number]   Final    Hold Specimen 2024 JIC   Final       Pertinent Radiology:  See imagiang tab      OB History  OB History    Para Term  AB Living   3 2 2 0 0 2   SAB IAB Ectopic Multiple Live  Births   0 0 0 0 2      # Outcome Date GA Lbr Addison/2nd Weight Sex Type Anes PTL Lv   3 Current            2 Term 23 39w6d / 00:05 3.46 kg (7 lb 10.1 oz) F Vag-Spont None N CJ      Birth Comments: nuchal cord and around shoulders      Name: Glenna      Apgar1: 9  Apgar5: 9   1 Term 22 42w0d  3.657 kg (8 lb 1 oz) F  None  CJ      Name: Yoanna       Medical History  Patient Active Problem List   Diagnosis    Constipation    Eczema    Irregular menstrual cycle    Sciatica of right side    Migraine with aura    Branch retinal artery occlusion    Need for Tdap vaccination    Encounter for triage in pregnant patient    Normal labor         Surgical History  She  has a past surgical history that includes CREATE EARDRUM OPENING,GEN ANESTH.       Social History  Reviewed, and she  reports that she has never smoked. She has never been exposed to tobacco smoke. She has never used smokeless tobacco. She reports that she does not drink alcohol and does not use drugs.        Family History  Reviewed, and family history includes Depression in an other family member; Diabetes in her cousin; Diverticulitis in her mother; Hypertension in her mother; No Known Problems in her brother, father, maternal grandfather, maternal grandmother, paternal grandfather, and paternal grandmother.   Psychosocial Needs  Social History     Social History Narrative    Not on file     Additional psychosocial needs reviewed per nursing assessment.       Allergies   Allergen Reactions    Amoxicillin Rash and Hives     Childhood rash      Medications Prior to Admission   Medication Sig Dispense Refill Last Dose/Taking    Prenatal Vit-Fe Fumarate-FA (PRENATAL PO)    Taking    acetaminophen (TYLENOL) 325 MG tablet Take 2 tablets (650 mg) by mouth every 6 hours as needed for mild pain Start after Delivery. (Patient not taking: Reported on 2024) 100 tablet 0         Review of Systems:  Normal except as noted in HPI   Physical  "Exam:  General appearance: WDL  Psych A&O  Cardiovascular RRR, no murmurs  GI/Abdomen nontender, gravid uterus  Respiratory clear bilat to ascultation  Extremities WDL      /76 (BP Location: Right arm, Patient Position: Semi-Leon's, Cuff Size: Adult Regular)   Pulse 77   Temp 98.2  F (36.8  C) (Oral)   Resp 16   Ht 1.651 m (5' 5\")   Wt 91.5 kg (201 lb 12.8 oz)   LMP 2024   BMI 33.58 kg/m          Membrane Status:     intact  Fluid color/            Cervical Exam:  6cm   90%   0              Fetal Heart Rate:    Fetus A: category I tracing              Uterine Activity:  Q2-3min                                 Notable AP/IP factors to date:  1.)+GBS allergy to Amoxicillin, plan to treat with Clindamycin  2.)hx of migraines with aura and hx of retinal occlusion  3.)blood type A positive      Impression:  Giselle Edwards is a 24 year old year old at 40w3d weeks in labor.      Plan:  Anticipate       Provider: Lynda Carrillo DNP,APRN,CNM   Total time: 20 mn at bedside and in the [facility] obtaining and clarifying the above history, performing the physical exam, providing patient education and counseling, coordinating and developing this plan of care.  Education, counseling and coordination of care time: >50%.    Date: 2024  Time: 1:07 AM    Giselle Edwards,  2000, MRN [id number]  "

## 2024-12-12 NOTE — PLAN OF CARE
Problem: Postpartum (Vaginal Delivery)  Goal: Hemostasis  12/12/2024 1731 by Angela Santo RN  Outcome: Progressing    Problem: Postpartum (Vaginal Delivery)  Goal: Optimal Pain Control and Function  12/12/2024 1731 by Angela Santo RN  Outcome: Progressing    Problem: Postpartum (Vaginal Delivery)  Goal: Effective Urinary Elimination  12/12/2024 1731 by Angela Santo RN  Outcome: Progressing  VSS. Pain is being managed with PRN Ibuprofen at this time. Bleeding is light. Fundus firm and midline. Pt up and voiding independently in the room. Pt is bonding well with baby and breastfeeding adequately.

## 2024-12-12 NOTE — PLAN OF CARE
Problem: Postpartum (Vaginal Delivery)  Goal: Successful Parent Role Transition  Outcome: Progressing  Goal: Absence of Infection Signs and Symptoms  Outcome: Progressing  Goal: Optimal Pain Control and Function  Outcome: Progressing  Intervention: Prevent or Manage Pain  Recent Flowsheet Documentation  Taken 12/12/2024 0515 by Alexandria Stringer, RN  Perineal Care:   perineal hygiene encouraged   perineum cleansed   Goal Outcome Evaluation:         Baby breastfeeding on demand every 2-3 hours. Pain controlled with toradol. Up independently, unable to void, will attempt again later. Postpartum assessment WNL, see flowsheets for more information.  Bolivar at bedside, supportive. Caregiver education and support on-going.    Alexandria Stringer, RN

## 2024-12-13 PROCEDURE — 99207 PR SUBSEQUENT HOSPITAL CARE FOR MOTHER, 15 MINUTES: CPT | Performed by: ADVANCED PRACTICE MIDWIFE

## 2024-12-13 PROCEDURE — 120N000001 HC R&B MED SURG/OB

## 2024-12-13 PROCEDURE — 250N000013 HC RX MED GY IP 250 OP 250 PS 637: Performed by: MIDWIFE

## 2024-12-13 RX ADMIN — IBUPROFEN 800 MG: 800 TABLET, FILM COATED ORAL at 22:14

## 2024-12-13 RX ADMIN — ACETAMINOPHEN 650 MG: 325 TABLET ORAL at 11:05

## 2024-12-13 RX ADMIN — DOCUSATE SODIUM 100 MG: 100 CAPSULE, LIQUID FILLED ORAL at 09:49

## 2024-12-13 RX ADMIN — IBUPROFEN 800 MG: 800 TABLET, FILM COATED ORAL at 12:52

## 2024-12-13 RX ADMIN — ACETAMINOPHEN 650 MG: 325 TABLET ORAL at 04:02

## 2024-12-13 ASSESSMENT — ACTIVITIES OF DAILY LIVING (ADL)
ADLS_ACUITY_SCORE: 27

## 2024-12-13 NOTE — DISCHARGE INSTRUCTIONS
Warning Signs after Having a Baby    Keep this paper on your fridge or somewhere else where you can see it.    Call your provider if you have any of these symptoms up to 12 weeks after having your baby.    Thoughts of hurting yourself or your baby  Pain in your chest or trouble breathing  Severe headache not helped by pain medicine  Eyesight concerns (blurry vision, seeing spots or flashes of light, other changes to eyesight)  Fainting, shaking or other signs of a seizure    Call 9-1-1 if you feel that it is an emergency.     The symptoms below can happen to anyone after giving birth. They can be very serious. Call your provider if you have any of these warning signs.    My provider s phone number: _______________________    Losing too much blood (hemorrhage)    Call your provider if you soak through a pad in less than an hour or pass blood clots bigger than a golf ball. These may be signs that you are bleeding too much.    Blood clots in the legs or lungs    After you give birth, your body naturally clots its blood to help prevent blood loss. Sometimes this increased clotting can happen in other areas of the body, like the legs or lungs. This can block your blood flow and be very dangerous.     Call your provider if you:  Have a red, swollen spot on the back of your leg that is warm or painful when you touch it.   Are coughing up blood.     Infection    Call your provider if you have any of these symptoms:  Fever of 100.4 F (38 C) or higher.  Pain or redness around your stitches if you had an incision.   Any yellow, white, or green fluid coming from places where you had stitches or surgery.    Mood Problems (postpartum depression)    Many people feel sad or have mood changes after having a baby. But for some people, these mood swings are worse.     Call your provider right away if you feel so anxious or nervous that you can't care for yourself or your baby.    Preeclampsia (high blood pressure)    Even if you  "didn't have high blood pressure when you were pregnant, you are at risk for the high blood pressure disease called preeclampsia. This risk can last up to 12 weeks after giving birth.     Call your provider if you have:   Pain on your right side under your rib cage  Sudden swelling in the hands and face    Remember: You know your body. If something doesn't feel right, get medical help.     For informational purposes only. Not to replace the advice of your health care provider. Copyright 2020 [facility]. All rights reserved. Clinically reviewed by Teresa Chapa, RNC-OB, MSN. [employer] [id number] - Rev .    Assessment of Breastfeeding after discharge: Is baby getting enough to eat?    If you answer  YES  to all these questions by day 5, you will know breastfeeding is going well.    If you answer  NO  to any of these questions, call your baby's medical provider or the lactation clinic.   Refer to \"Postpartum and [place] Care\" (PNC) , starting on page 35. (This is the booklet you tracked baby's feedings and diaper counts while in the hospital.)   Please call one of our Outpatient Lactation Consultants at [phone] at any time with breastfeeding questions or concerns.    1.  My milk came in (breasts became lloyd on day 3-5 after birth).  I am softening the areola using hand expression or reverse pressure softening prior to latch, as needed.  YES NO   2.  My baby breastfeeds at least 8 times in 24 hours. YES NO   3.  My baby usually gives feeding cues (answer  No  if your baby is sleepy and you need to wake baby for most feedings).  *PNC page 36   YES NO   4.  My baby latches on my breast easily.  *PNC page 37  YES NO   5.  During breastfeeding, I hear my baby frequently swallowing, (one-two sucks per swallow).  YES NO   6.  I allow my baby to drain the first breast before I offer the other side.   YES NO   7.  My baby is satisfied after breastfeeding.   *PNC page 39 YES NO   8.  My breasts feel " "lloyd before feedings and softer after feedings. YES NO   9.  My breasts and nipples are comfortable.  I have no engorgement or cracked nipples.    *PNC Page 40 and 41  YES NO   10.  My baby is meeting the wet diaper goals each day.  *PNC page 38  YES NO   11.  My baby is meeting the soiled diaper goals each day. *PNC page 38 YES NO   12.  My baby is only getting my breast milk, no formula. YES NO   13. I know my baby needs to be back to birth weight by day 14.  YES NO   14. I know my baby will cluster feed and have growth spurts. *PNC page 39  YES NO   15.  I feel confident in breastfeeding.  If not, I know where to get support. YES NO      [employer] has a short video (2:47) called:   \"[place] Hold/Asymmetric Latch\" Breastfeeding Education by MIGUE.        Other websites:  www.ibconline.ca-Breastfeeding Videos  [email or web address]--Our videos-Breastfeeding  [email or web address]    "

## 2024-12-13 NOTE — LACTATION NOTE
This note was copied from a baby's chart.  LACTATION Handoff    Baby Name: Baby Girl    Birth/Delivery Concerns:  Vaginal delivery, 3rd baby    Nipple issues: everted    Latch achieved: deep latch and audible swallows    Best position:  football and cross cradle    Nipple shield: no    Pumping: no    Ed folder reviewed: yes    LACTDC entered: Yes

## 2024-12-13 NOTE — PLAN OF CARE
Patient denied pain for most of this shift. Requested PRN tylenol dose x1 and felt relief from this dose. Bonding well with infant. Breastfeeding on demand. Stated infant had been feeding for 1 hour at a time, every 2-2.5 hours. Educated patient to limit feeds to 30-40 minutes to prevent infant from burning up energy and calories taken in from prolonged feed. Verbalized understanding. Bleeding WDL, no change per patient. First degree laceration appears swollen, otherwise WDL. Up independently in room and voiding. VSS.    Goal: Absence of Hospital-Acquired Illness or Injury  Outcome: Progressing  Intervention: Prevent Skin Injury  Recent Flowsheet Documentation  Taken 12/13/2024 0127 by Karon Silver RN  Body Position: position changed independently  Taken 12/12/2024 2130 by Karon Silver RN  Body Position: position changed independently  Intervention: Prevent Infection  Recent Flowsheet Documentation  Taken 12/13/2024 0127 by Karon Silver RN  Infection Prevention:   environmental surveillance performed   rest/sleep promoted  Taken 12/12/2024 2130 by Karon Silver RN  Infection Prevention:   environmental surveillance performed   rest/sleep promoted  Goal: Optimal Comfort and Wellbeing  Outcome: Progressing  Intervention: Provide Person-Centered Care  Recent Flowsheet Documentation  Taken 12/13/2024 0127 by Karon Silver RN  Trust Relationship/Rapport:   care explained   choices provided   questions answered   thoughts/feelings acknowledged  Taken 12/12/2024 2130 by Karon Silver RN  Trust Relationship/Rapport:   care explained   choices provided   questions answered   thoughts/feelings acknowledged  Goal: Readiness for Transition of Care  Outcome: Progressing     Problem: Postpartum (Vaginal Delivery)  Goal: Successful Parent Role Transition  Outcome: Progressing  Goal: Absence of Infection Signs and Symptoms  Outcome: Progressing  Goal: Optimal Pain Control and Function  Outcome: Progressing  Intervention:  Prevent or Manage Pain  Recent Flowsheet Documentation  Taken 12/13/2024 0127 by Karon Silver, RN  Perineal Care:   medicated pads applied   perineum cleansed  Taken 12/12/2024 2130 by Karon Silver, RN  Perineal Care:   medicated pads applied   perineum cleansed     Problem: Postpartum (Vaginal Delivery)  Goal: Hemostasis  Outcome: Met  Goal: Anesthesia/Sedation Recovery  Outcome: Met  Goal: Effective Urinary Elimination  Outcome: Met   Goal Outcome Evaluation:

## 2024-12-13 NOTE — PROGRESS NOTES
"Vaginal Delivery Postpartum Day 1    Patient Name:  Giselle Edwards  :      2000  MRN:      [id number]      Subjective:  Giselle reports feeling well and integrating birth experience. Feels grateful for her care. Denies any dizziness or palpitations with ambulation. Bleeding less than menses and not passing clots. No concerns with urination while using peribottle. No BM yet but passing flatus. Pain well controlled with ibuprofen and acetaminophen. Baby \"Luisa\" is well and being fed by breast. Notes some mild discomfort with latch and seeing cracking of nipples. Feels that she has adequate knowledge and experience to correct latch and improve breastfeeding; declines lactation visit at this time. Has not used nipple shield previously but open to this option.     Objective:  /79 (BP Location: Right arm, Patient Position: Semi-Leon's, Cuff Size: Adult Regular)   Pulse 90   Temp 98.1  F (36.7  C) (Oral)   Resp 16   Ht 1.651 m (5' 5\")   Wt 91.5 kg (201 lb 12.8 oz)   LMP 2024   Breastfeeding Unknown   BMI 33.58 kg/m      Breasts: soft and filling; bilateral cracks on nipples; nipple flat but mervat with stimulation  Abdomen: nontender; FF at U/1, midline  Perineum: mild edema; scant lochia rubra without clots  Extremities: edema +1, no redness, warmth, or tenderness      @LABRSLTOB(ABORH EXT,LN-ABORH,HML ABO/RH,HGB EXT,HGB,RUBELLA EXT,LN-RUBELLA IGG ANTIBODY:Last:1)@    Immunization History   Administered Date(s) Administered    COVID-19 Vaccine ([place]) 2021    DTAP (<7y) 1999, 1999, 1999, 2000, 2004    HEPA 2012, 2013    HEPATITIS A (PEDS 12M-18Y) 2012, 2013    HPV 2012, 2013, 10/17/2014    HPV Quadrivalent 2012, 2013, 10/17/2014    HepB 1999, 1999, 1999    Hepatitis B, Peds 1999, 1999, 1999    Historic Hib Hib-titer 1999, 1999, 1999, 2000    " Influenza (IIV3) PF 2003, 2007, 2009, 10/17/2014    Influenza Vaccine >6 months,quad, PF 10/17/2014, 2015, 2017    Influenza Vaccine, 6+MO IM (QUADRIVALENT W/PRESERVATIVES) 10/11/2018, 10/15/2020    MMR 2000, 2004    Meningococcal ACWY (Menactra ) 2012, 2013, 2019    Meningococcal ACWY (Menveo ) 2012, 2013    Pneumococcal (PCV 7) 2000    Pneumococcal 23 valent 2000    Poliovirus, inactivated (IPV) 1999, 1999, 2000, 2004    RSV Vaccine (Abrysvo) 2024    TD,PF 7+ (Tenivac) 2021    TDAP (Adacel,Boostrix) 2010, 2022, 2023, 2024    TDAP Vaccine (Boostrix) 2010    Varicella 2000, 2009       Assessment:  Stable PPD #1.  Breastfeeding    Plan: Reviewed the importance of rest, skin-to-skin and bonding with baby. Discussed methods for improving latch and provided with nipple shield and nipple cream. Encouraged to call RN for hands-on assistance with next nursing session as baby not ready to nurse at time of visit. Discussed importance of not settling for painful latch and inserting finger in baby's mouth to unlatch if feeling sharp/pinch. Briefly reviewed warning signs and plan to follow-up with primary CNM team in 2 weeks. Anticipate discharge tomorrow morning.       Provider:  HOA Rosenbaum CNM      Date:  2024  Time:  10:13 AM    Patient Name:  Giselle Edwards  :  2000  MRN:  [id number]

## 2024-12-13 NOTE — PLAN OF CARE
Goal Outcome Evaluation:      Plan of Care Reviewed With: patient    Overall Patient Progress: improvingOverall Patient Progress: improving         Assessment: VSS. Bleeding light w/o clots. Fundus firm, midline, 1 below umbilicus. Pain well controlled per pt with ibuprofen and tylenol.    Fdgs: Continues to work on breastfeeding.     Bonding well with infant. Questions encouraged and answered.

## 2024-12-14 VITALS
TEMPERATURE: 98.3 F | HEART RATE: 88 BPM | HEIGHT: 65 IN | WEIGHT: 191.14 LBS | DIASTOLIC BLOOD PRESSURE: 76 MMHG | SYSTOLIC BLOOD PRESSURE: 123 MMHG | BODY MASS INDEX: 31.85 KG/M2 | RESPIRATION RATE: 16 BRPM

## 2024-12-14 PROCEDURE — 250N000013 HC RX MED GY IP 250 OP 250 PS 637: Performed by: MIDWIFE

## 2024-12-14 PROCEDURE — 99207 PR SUBSEQUENT HOSPITAL CARE FOR MOTHER, 15 MINUTES: CPT | Performed by: ADVANCED PRACTICE MIDWIFE

## 2024-12-14 RX ORDER — DOCUSATE SODIUM 100 MG/1
100 CAPSULE, LIQUID FILLED ORAL DAILY
COMMUNITY
Start: 2024-12-15

## 2024-12-14 RX ORDER — IBUPROFEN 600 MG/1
600 TABLET, FILM COATED ORAL EVERY 6 HOURS PRN
COMMUNITY
Start: 2024-12-14

## 2024-12-14 RX ADMIN — DOCUSATE SODIUM 100 MG: 100 CAPSULE, LIQUID FILLED ORAL at 11:08

## 2024-12-14 ASSESSMENT — ACTIVITIES OF DAILY LIVING (ADL)
ADLS_ACUITY_SCORE: 27

## 2024-12-14 NOTE — DISCHARGE SUMMARY
"CNM Postpartum Discharge Note    SIGNIFICANT PROBLEMS:  Patient Active Problem List    Diagnosis Date Noted     (normal spontaneous vaginal delivery) 2024     Priority: Medium    First degree laceration of perineum during delivery, postpartum 2024     Priority: Medium    Need for Tdap vaccination 2024     Priority: Medium    Sciatica of right side 2021     Priority: Medium    Migraine with aura 2019     Priority: Medium    Branch retinal artery occlusion 2019     Priority: Medium     Formatting of this note might be different from the original.  Had prolonged vision changes with no migraine. 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 2019      Irregular menstrual cycle 2016     Priority: Medium    Eczema 2010     Priority: Medium    Constipation 2005     Priority: Medium     Problem list name updated by automated process. Provider to review           SUBJECTIVE:  Patient is stable and is tolerating acitivity well  Baby is rooming in  Complications since 2 hours post delivery: None  Pain is well controlled.  Patient is taking pain medications.  Breastfeeding status:initiated and latching difficulties. Pt reports that this is normal for her and it improves after milk increases in volume.   Elimination:  She is voiding without difficulty.  She has not had a bowel movement  Desired contraception Condoms  Denies heavy bleeding and passing large clots.    INTERVAL HISTORY:  /76 (BP Location: Right arm, Patient Position: Semi-Leon's, Cuff Size: Adult Regular)   Pulse 88   Temp 98.3  F (36.8  C) (Oral)   Resp 16   Ht 1.651 m (5' 5\")   Wt 86.7 kg (191 lb 2.2 oz)   LMP 2024   Breastfeeding Unknown   BMI 31.81 kg/m      Constitutional: healthy, alert, and no distress    Breasts: breastfeeding with nipple ecchymosis and one small crack on left nipple     Fundus: Uterine fundus is firm, non-tender and at 3 below the level of the " "umbilicus    Perineum: Perineum is well approximated, minimal swelling    Lochia: Lochia is appropriate for the duration of time since delivery.     Postpartum hemoglobin   Hemoglobin   Date Value Ref Range Status   12/12/2024 11.4 (L) 11.7 - 15.7 g/dL Final   08/11/2011 14.0 11.7 - 15.7 g/dL Final     Blood type No results found for: \"ABO\"  No results found for: \"RH\"  Rubella status No results found for: \"RUBELLAABIGG\"  History of depression:  no    ASSESSMENT/PLAN:  Normal postpartum course  Stable Post-partum day #2  Complications:breastfeeding difficulties; declines home visit and will call if she needs support.  Postpartum warning s/s reviewed, including bleeding/clots, fever, mastitis & thromboemboli   Exercise, diet and rest reviewed  PP Teri/christine reviewed  Continue prenatal vitamins while breastfeeding  Birthcontrol planned:Condoms  Educated on postpartum blues and postpartum depression warnings signs/symptoms  2 week office visit   Follow-up in 6 weeks for postpartum visit  Plan d/c home today    Current Discharge Medication List        START taking these medications    Details   docusate sodium (COLACE) 100 MG capsule Take 1 capsule (100 mg) by mouth daily.    Associated Diagnoses: Postpartum care and examination of lactating mother      ibuprofen (ADVIL/MOTRIN) 600 MG tablet Take 1 tablet (600 mg) by mouth every 6 hours as needed for moderate pain.    Associated Diagnoses: Postpartum care and examination of lactating mother           CONTINUE these medications which have NOT CHANGED    Details   Prenatal Vit-Fe Fumarate-FA (PRENATAL PO)            STOP taking these medications       acetaminophen (TYLENOL) 325 MG tablet Comments:   Reason for Stopping:               HOA Mandujano CNM     "

## 2024-12-14 NOTE — PLAN OF CARE
Data: Vital signs within normal limits. Postpartum checks within normal limits - see flow record. Patient eating and drinking normally. Patient able to empty bladder independently and is up ambulating. No apparent signs of infection.  Patient performing self cares and is able to care for infant.  Action: Patient medicated during the shift for pain and cramping. See MAR. Patient reassessed within 1 hour after each medication and pain was improved - patient stated she was comfortable. se: Positive attachment behaviors observed with infant. Support persons [place] present.   Plan: Anticipate discharge on 12/14.    Emily Werner RN    Problem: Adult Inpatient Plan of Care  Goal: Readiness for Transition of Care  Outcome: Progressing     Problem: Adult Inpatient Plan of Care  Goal: Optimal Comfort and Wellbeing  Outcome: Progressing   Goal Outcome Evaluation: Progressing

## 2024-12-14 NOTE — LACTATION NOTE
This note was copied from a baby's chart.  Mother states feedings continue to go well, although her nipples are feeling more tender. Encouraged mother to ensure baby has a deep latch. Talked about how to take the baby off the breast in a way that doesn't pull on the nipple. Mother using nipple butter and states she is aware of signs of mastitis.

## 2024-12-26 ENCOUNTER — TELEPHONE (OUTPATIENT)
Dept: MIDWIFE SERVICES | Facility: CLINIC | Age: 24
End: 2024-12-26
Payer: COMMERCIAL

## 2024-12-26 ENCOUNTER — LAB (OUTPATIENT)
Dept: LAB | Facility: CLINIC | Age: 24
End: 2024-12-26
Payer: COMMERCIAL

## 2024-12-26 DIAGNOSIS — R30.0 DYSURIA: ICD-10-CM

## 2024-12-26 DIAGNOSIS — N30.01 ACUTE CYSTITIS WITH HEMATURIA: ICD-10-CM

## 2024-12-26 DIAGNOSIS — R30.0 DYSURIA: Primary | ICD-10-CM

## 2024-12-26 LAB
ALBUMIN UR-MCNC: >=300 MG/DL
APPEARANCE UR: ABNORMAL
BACTERIA #/AREA URNS HPF: ABNORMAL /HPF
BILIRUB UR QL STRIP: NEGATIVE
COLOR UR AUTO: YELLOW
GLUCOSE UR STRIP-MCNC: NEGATIVE MG/DL
HGB UR QL STRIP: ABNORMAL
KETONES UR STRIP-MCNC: NEGATIVE MG/DL
LEUKOCYTE ESTERASE UR QL STRIP: ABNORMAL
MUCOUS THREADS #/AREA URNS LPF: PRESENT /LPF
NITRATE UR QL: NEGATIVE
PH UR STRIP: 7 [PH] (ref 5–8)
RBC #/AREA URNS AUTO: ABNORMAL /HPF
SP GR UR STRIP: 1.02 (ref 1–1.03)
SQUAMOUS #/AREA URNS AUTO: ABNORMAL /LPF
UROBILINOGEN UR STRIP-ACNC: 0.2 E.U./DL
WBC #/AREA URNS AUTO: ABNORMAL /HPF
WBC CLUMPS #/AREA URNS HPF: PRESENT /HPF

## 2024-12-26 RX ORDER — PHENAZOPYRIDINE HYDROCHLORIDE 200 MG/1
200 TABLET, FILM COATED ORAL 3 TIMES DAILY PRN
Qty: 6 TABLET | Refills: 0 | Status: SHIPPED | OUTPATIENT
Start: 2024-12-26

## 2024-12-26 RX ORDER — NITROFURANTOIN 25; 75 MG/1; MG/1
100 CAPSULE ORAL 2 TIMES DAILY
Qty: 14 CAPSULE | Refills: 0 | Status: SHIPPED | OUTPATIENT
Start: 2024-12-26

## 2024-12-26 NOTE — TELEPHONE ENCOUNTER
Returned call to pt, let her know UA/UC orders placed for urinary symptoms and can do a lab appointment at any [facility] lab to drop off a sample and we will call her with results. Pt denies any bleeding as well.     Pt verbalized understanding, in agreement with plan, and voiced no further questions.  Raisa Leslie RN on 12/26/2024 at 8:52 AM

## 2024-12-26 NOTE — TELEPHONE ENCOUNTER
[facility]    Phone Message    May a detailed message be left on voicemail: yes     Reason for Call: Symptoms or Concerns     If patient has red-flag symptoms, warm transfer to triage line    Current symptom or concern: Patient is experiencing some urination issues and feels like she cannot fully empty bladder, lower back and abdomen pain. Had baby 2 weeks ago.     Symptoms have been present for:  2 day    Has patient previously been seen for this? No        Action Taken: Other: [facility]    Travel Screening: Not Applicable     Date of Service:

## 2025-02-05 ASSESSMENT — EDINBURGH POSTNATAL DEPRESSION SCALE (EPDS)
TOTAL SCORE: 0
THINGS HAVE BEEN GETTING ON TOP OF ME: NO, I HAVE BEEN COPING AS WELL AS EVER
THE THOUGHT OF HARMING MYSELF HAS OCCURRED TO ME: NEVER
I HAVE FELT SAD OR MISERABLE: NO, NOT AT ALL
I HAVE BEEN ANXIOUS OR WORRIED FOR NO GOOD REASON: NO, NOT AT ALL
I HAVE BLAMED MYSELF UNNECESSARILY WHEN THINGS WENT WRONG: NO, NEVER
I HAVE BEEN SO UNHAPPY THAT I HAVE HAD DIFFICULTY SLEEPING: NOT AT ALL
I HAVE BEEN SO UNHAPPY THAT I HAVE BEEN CRYING: NO, NEVER
I HAVE BEEN ABLE TO LAUGH AND SEE THE FUNNY SIDE OF THINGS: AS MUCH AS I ALWAYS COULD
I HAVE FELT SCARED OR PANICKY FOR NO GOOD REASON: NO, NOT AT ALL
I HAVE LOOKED FORWARD WITH ENJOYMENT TO THINGS: AS MUCH AS I EVER DID

## 2025-02-06 ENCOUNTER — PRENATAL OFFICE VISIT (OUTPATIENT)
Dept: MIDWIFE SERVICES | Facility: CLINIC | Age: 25
End: 2025-02-06
Payer: COMMERCIAL

## 2025-02-06 VITALS
WEIGHT: 174.9 LBS | HEART RATE: 99 BPM | OXYGEN SATURATION: 98 % | TEMPERATURE: 97.9 F | DIASTOLIC BLOOD PRESSURE: 71 MMHG | BODY MASS INDEX: 29.1 KG/M2 | SYSTOLIC BLOOD PRESSURE: 110 MMHG

## 2025-02-06 DIAGNOSIS — Z12.4 SCREENING FOR CERVICAL CANCER: ICD-10-CM

## 2025-02-06 DIAGNOSIS — J01.10 ACUTE NON-RECURRENT FRONTAL SINUSITIS: ICD-10-CM

## 2025-02-06 RX ORDER — DOXYCYCLINE 100 MG/1
100 CAPSULE ORAL 2 TIMES DAILY
Qty: 14 CAPSULE | Refills: 0 | Status: SHIPPED | OUTPATIENT
Start: 2025-02-06

## 2025-02-06 ASSESSMENT — ANXIETY QUESTIONNAIRES
GAD7 TOTAL SCORE: 0
2. NOT BEING ABLE TO STOP OR CONTROL WORRYING: NOT AT ALL
5. BEING SO RESTLESS THAT IT IS HARD TO SIT STILL: NOT AT ALL
3. WORRYING TOO MUCH ABOUT DIFFERENT THINGS: NOT AT ALL
6. BECOMING EASILY ANNOYED OR IRRITABLE: NOT AT ALL
IF YOU CHECKED OFF ANY PROBLEMS ON THIS QUESTIONNAIRE, HOW DIFFICULT HAVE THESE PROBLEMS MADE IT FOR YOU TO DO YOUR WORK, TAKE CARE OF THINGS AT HOME, OR GET ALONG WITH OTHER PEOPLE: NOT DIFFICULT AT ALL
1. FEELING NERVOUS, ANXIOUS, OR ON EDGE: NOT AT ALL
7. FEELING AFRAID AS IF SOMETHING AWFUL MIGHT HAPPEN: NOT AT ALL
GAD7 TOTAL SCORE: 0

## 2025-02-06 ASSESSMENT — PATIENT HEALTH QUESTIONNAIRE - PHQ9
5. POOR APPETITE OR OVEREATING: NOT AT ALL
SUM OF ALL RESPONSES TO PHQ QUESTIONS 1-9: 0

## 2025-02-06 NOTE — PROGRESS NOTES
SUBJECTIVE:   Giselle Edwards is here for her 6-week postpartum checkup. She is now     HPI: Pt pleased with birth, she was diverted to [facility], reports a good experience.  Arrived around midnight, delivered by 3 am    C/O a sinus infection, started about 2 wks ago when her family all got a virus with fevers.  In the past 3-4 days the illness has moved into her right temple and jaw pressure and pain.    DELIVERY DATE: 24   of a viable girl, weight 7 pounds 12 oz., with no complications.  FEEDING METHOD:Breast    CONTRACEPTION PLANNED: condoms  She  has had intercourse with No discomfort. since delivery.  She has not resumed menstruating.    DEPRESSION: Pt denies   depression.  Today's Depression Rating was: PATIENT HEALTH QUESTIONNAIRE-9 (PHQ - 9)    Over the last 2 weeks, how often have you been bothered by any of the following problems?    1. Little interest or pleasure in doing things -  Not at all   2. Feeling down, depressed, or hopeless -  Not at all   3. Trouble falling or staying asleep, or sleeping too much - Not at all   4. Feeling tired or having little energy -  Not at all   5. Poor appetite or overeating -  Not at all   6. Feeling bad about yourself - or that you are a failure or have let yourself or your family down -  Not at all   7. Trouble concentrating on things, such as reading the newspaper or watching television - Not at all   8. Moving or speaking so slowly that other people could have noticed? Or the opposite - being so fidgety or restless that you have been moving around a lot more than usual Not at all   9. Thoughts that you would be better off dead or of hurting  yourself in some way Not at all   Total Score: 0     If you checked off any problems, how difficult have these problems made it for you to do your work, take care of things at home, or get along with other people? Not difficult at all    Developed by Georgina Castañeda, Myriam Velásquez, Mark Flores and  colleagues, with an educational jai from [employer] No permission required to reproduce, translate, display or distribute. permission required to reproduce, translate, display or distribute.      OTHER HPI: She has No signs of infection, bleeding or other complications.   Denies difficulty voiding or with bowel movements.  Reports epis/lac is healing well.    EXAM:  /71   Pulse 99   Temp 97.9  F (36.6  C) (Oral)   Wt 79.3 kg (174 lb 14.4 oz)   LMP 2024   SpO2 98%   Breastfeeding Yes   BMI 29.10 kg/m    GENERAL APPEARANCE: healthy, alert and no distress,NECK: thyroid normal to palpation,BREAST: soft, nontender, nipples intact, lactating ,ABDOMEN: soft, nontender, diastasis recti closing,PSYCH: mentation appears normal and affect normal/bright  PELVIC EXAM:  Vulva: BUS WNL, no lesions noted,   Vagina:  no lesions, well rugated, good tone,   Cervix: Smooth, pink, no visible lesions, neg CMT,   Uterus: Normal size and position, non-tender, mobile,   Ovaries: No masses palpable, non-tender, mobile,   Rectal exam: deferred        ASSESSMENT:   Normal postpartum exam after .    PLAN:    (Z39.2) Routine postpartum follow-up  (primary encounter diagnosis)  Comment:   Plan:     (J01.10) Acute non-recurrent frontal sinusitis  Comment:   Plan: doxycycline monohydrate (MONODOX) 100 MG         capsule            (Z12.4) Screening for cervical cancer  Comment:   Plan: Pap Screen Reflex to HPV if ASCUS - Recommended        Age 25 - 29 Years              Birth Control as ordered. Fertility reviewed.    Return as needed or at time interval for next routine pap, pelvic, or breast exam.  Encourage Kegals and abdominal exercise. Slow, steady weight loss.  Continue a multi vitamin supplement, especially if breastfeeding.    Laura Goldsmith CNM

## 2025-02-10 LAB
BKR LAB AP GYN ADEQUACY: NORMAL
BKR LAB AP GYN INTERPRETATION: NORMAL
BKR LAB AP HPV REFLEX: NORMAL
BKR LAB AP PREVIOUS ABNORMAL: NORMAL
PATH REPORT.COMMENTS IMP SPEC: NORMAL
PATH REPORT.COMMENTS IMP SPEC: NORMAL
PATH REPORT.RELEVANT HX SPEC: NORMAL

## 2025-07-12 ENCOUNTER — HEALTH MAINTENANCE LETTER (OUTPATIENT)
Age: 25
End: 2025-07-12